# Patient Record
Sex: FEMALE | Race: WHITE | Employment: OTHER | URBAN - METROPOLITAN AREA
[De-identification: names, ages, dates, MRNs, and addresses within clinical notes are randomized per-mention and may not be internally consistent; named-entity substitution may affect disease eponyms.]

---

## 2017-04-28 PROBLEM — Z94.0 HISTORY OF KIDNEY TRANSPLANT: Chronic | Status: ACTIVE | Noted: 2017-04-28

## 2017-04-28 PROBLEM — I16.0 HYPERTENSIVE URGENCY: Status: ACTIVE | Noted: 2017-04-28

## 2017-04-28 PROBLEM — E03.9 HYPOTHYROIDISM: Chronic | Status: ACTIVE | Noted: 2017-04-28

## 2017-04-28 PROBLEM — K58.9 IBS (IRRITABLE BOWEL SYNDROME): Chronic | Status: ACTIVE | Noted: 2017-04-28

## 2017-04-28 PROBLEM — K21.9 GERD (GASTROESOPHAGEAL REFLUX DISEASE): Chronic | Status: ACTIVE | Noted: 2017-04-28

## 2017-04-28 PROBLEM — D69.6 THROMBOCYTOPENIA (HCC): Chronic | Status: ACTIVE | Noted: 2017-04-28

## 2017-04-28 PROBLEM — Z94.83 H/O PANCREAS TRANSPLANT (HCC): Chronic | Status: ACTIVE | Noted: 2017-04-28

## 2017-04-28 PROBLEM — E11.42 DIABETIC PERIPHERAL NEUROPATHY (HCC): Chronic | Status: ACTIVE | Noted: 2017-04-28

## 2017-11-24 PROBLEM — E10.10 DKA, TYPE 1, NOT AT GOAL (HCC): Status: ACTIVE | Noted: 2017-11-24

## 2018-09-28 ENCOUNTER — HOSPITAL ENCOUNTER (INPATIENT)
Age: 48
LOS: 3 days | Discharge: NURSING, REHAB OR LONG-TERM CARE | DRG: 637 | End: 2018-10-01
Attending: EMERGENCY MEDICINE | Admitting: EMERGENCY MEDICINE
Payer: MEDICARE

## 2018-09-28 DIAGNOSIS — E10.10 DIABETIC KETOACIDOSIS WITHOUT COMA ASSOCIATED WITH TYPE 1 DIABETES MELLITUS (HCC): Primary | ICD-10-CM

## 2018-09-28 DIAGNOSIS — I10 HYPERTENSION, UNSPECIFIED TYPE: ICD-10-CM

## 2018-09-28 DIAGNOSIS — E11.42 DIABETIC PERIPHERAL NEUROPATHY (HCC): Chronic | ICD-10-CM

## 2018-09-28 DIAGNOSIS — R11.0 NAUSEA: ICD-10-CM

## 2018-09-28 LAB
A/G RATIO: 1.3 (ref 1.1–2.2)
ALBUMIN SERPL-MCNC: 4.2 G/DL (ref 3.4–5)
ALP BLD-CCNC: 161 U/L (ref 40–129)
ALT SERPL-CCNC: 12 U/L (ref 10–40)
ANION GAP SERPL CALCULATED.3IONS-SCNC: 16 MMOL/L (ref 3–16)
ANION GAP SERPL CALCULATED.3IONS-SCNC: 20 MMOL/L (ref 3–16)
ANION GAP SERPL CALCULATED.3IONS-SCNC: 29 MMOL/L (ref 3–16)
ANION GAP SERPL CALCULATED.3IONS-SCNC: 34 MMOL/L (ref 3–16)
AST SERPL-CCNC: 8 U/L (ref 15–37)
BASE EXCESS VENOUS: -14.8 MMOL/L (ref -3–3)
BASOPHILS ABSOLUTE: 0.1 K/UL (ref 0–0.2)
BASOPHILS RELATIVE PERCENT: 1.1 %
BETA-HYDROXYBUTYRATE: 6.41 MMOL/L (ref 0–0.27)
BILIRUB SERPL-MCNC: 0.4 MG/DL (ref 0–1)
BUN BLDV-MCNC: 35 MG/DL (ref 7–20)
BUN BLDV-MCNC: 35 MG/DL (ref 7–20)
BUN BLDV-MCNC: 36 MG/DL (ref 7–20)
BUN BLDV-MCNC: 8 MG/DL (ref 7–20)
CALCIUM SERPL-MCNC: 8.8 MG/DL (ref 8.3–10.6)
CALCIUM SERPL-MCNC: 9 MG/DL (ref 8.3–10.6)
CALCIUM SERPL-MCNC: 9.1 MG/DL (ref 8.3–10.6)
CALCIUM SERPL-MCNC: 9.3 MG/DL (ref 8.3–10.6)
CARBOXYHEMOGLOBIN: 2.6 % (ref 0–1.5)
CHLORIDE BLD-SCNC: 80 MMOL/L (ref 99–110)
CHLORIDE BLD-SCNC: 81 MMOL/L (ref 99–110)
CHLORIDE BLD-SCNC: 86 MMOL/L (ref 99–110)
CHLORIDE BLD-SCNC: 97 MMOL/L (ref 99–110)
CO2: 11 MMOL/L (ref 21–32)
CO2: 14 MMOL/L (ref 21–32)
CO2: 20 MMOL/L (ref 21–32)
CO2: 24 MMOL/L (ref 21–32)
CREAT SERPL-MCNC: 1.6 MG/DL (ref 0.6–1.1)
CREAT SERPL-MCNC: 4.4 MG/DL (ref 0.6–1.1)
CREAT SERPL-MCNC: 4.5 MG/DL (ref 0.6–1.1)
CREAT SERPL-MCNC: 4.7 MG/DL (ref 0.6–1.1)
EOSINOPHILS ABSOLUTE: 0 K/UL (ref 0–0.6)
EOSINOPHILS RELATIVE PERCENT: 0.6 %
GFR AFRICAN AMERICAN: 12
GFR AFRICAN AMERICAN: 13
GFR AFRICAN AMERICAN: 13
GFR AFRICAN AMERICAN: 42
GFR NON-AFRICAN AMERICAN: 10
GFR NON-AFRICAN AMERICAN: 10
GFR NON-AFRICAN AMERICAN: 11
GFR NON-AFRICAN AMERICAN: 34
GLOBULIN: 3.3 G/DL
GLUCOSE BLD-MCNC: 104 MG/DL (ref 70–99)
GLUCOSE BLD-MCNC: 123 MG/DL (ref 70–99)
GLUCOSE BLD-MCNC: 129 MG/DL (ref 70–99)
GLUCOSE BLD-MCNC: 135 MG/DL (ref 70–99)
GLUCOSE BLD-MCNC: 141 MG/DL (ref 70–99)
GLUCOSE BLD-MCNC: 155 MG/DL (ref 70–99)
GLUCOSE BLD-MCNC: 174 MG/DL (ref 70–99)
GLUCOSE BLD-MCNC: 274 MG/DL (ref 70–99)
GLUCOSE BLD-MCNC: 651 MG/DL (ref 70–99)
GLUCOSE BLD-MCNC: 844 MG/DL (ref 70–99)
GLUCOSE BLD-MCNC: 928 MG/DL (ref 70–99)
GLUCOSE BLD-MCNC: >600 MG/DL (ref 70–99)
HCO3 VENOUS: 9.6 MMOL/L (ref 23–29)
HCT VFR BLD CALC: 36.1 % (ref 36–48)
HEMOGLOBIN: 11.6 G/DL (ref 12–16)
LIPASE: 295 U/L (ref 13–60)
LYMPHOCYTES ABSOLUTE: 0.9 K/UL (ref 1–5.1)
LYMPHOCYTES RELATIVE PERCENT: 13 %
MAGNESIUM: 2.1 MG/DL (ref 1.8–2.4)
MAGNESIUM: 2.2 MG/DL (ref 1.8–2.4)
MCH RBC QN AUTO: 31.3 PG (ref 26–34)
MCHC RBC AUTO-ENTMCNC: 32.2 G/DL (ref 31–36)
MCV RBC AUTO: 97.3 FL (ref 80–100)
METHEMOGLOBIN VENOUS: 0.6 %
MONOCYTES ABSOLUTE: 0.4 K/UL (ref 0–1.3)
MONOCYTES RELATIVE PERCENT: 5.2 %
NEUTROPHILS ABSOLUTE: 5.8 K/UL (ref 1.7–7.7)
NEUTROPHILS RELATIVE PERCENT: 80.1 %
O2 CONTENT, VEN: 17 VOL %
O2 SAT, VEN: 99 %
O2 THERAPY: ABNORMAL
PCO2, VEN: 20 MMHG (ref 40–50)
PDW BLD-RTO: 14.3 % (ref 12.4–15.4)
PERFORMED ON: ABNORMAL
PH VENOUS: 7.3 (ref 7.35–7.45)
PHOSPHORUS: 3.9 MG/DL (ref 2.5–4.9)
PHOSPHORUS: 4.5 MG/DL (ref 2.5–4.9)
PLATELET # BLD: 116 K/UL (ref 135–450)
PMV BLD AUTO: 9.9 FL (ref 5–10.5)
PO2, VEN: 169.7 MMHG (ref 25–40)
POTASSIUM SERPL-SCNC: 3.6 MMOL/L (ref 3.5–5.1)
POTASSIUM SERPL-SCNC: 3.9 MMOL/L (ref 3.5–5.1)
POTASSIUM SERPL-SCNC: 4.2 MMOL/L (ref 3.5–5.1)
POTASSIUM SERPL-SCNC: 4.5 MMOL/L (ref 3.5–5.1)
RBC # BLD: 3.71 M/UL (ref 4–5.2)
SODIUM BLD-SCNC: 124 MMOL/L (ref 136–145)
SODIUM BLD-SCNC: 125 MMOL/L (ref 136–145)
SODIUM BLD-SCNC: 126 MMOL/L (ref 136–145)
SODIUM BLD-SCNC: 137 MMOL/L (ref 136–145)
TCO2 CALC VENOUS: 10 MMOL/L
TOTAL PROTEIN: 7.5 G/DL (ref 6.4–8.2)
TROPONIN: 0.52 NG/ML
TROPONIN: 0.63 NG/ML
WBC # BLD: 7.3 K/UL (ref 4–11)

## 2018-09-28 PROCEDURE — 96374 THER/PROPH/DIAG INJ IV PUSH: CPT

## 2018-09-28 PROCEDURE — 99291 CRITICAL CARE FIRST HOUR: CPT | Performed by: INTERNAL MEDICINE

## 2018-09-28 PROCEDURE — 84484 ASSAY OF TROPONIN QUANT: CPT

## 2018-09-28 PROCEDURE — 82010 KETONE BODYS QUAN: CPT

## 2018-09-28 PROCEDURE — 83690 ASSAY OF LIPASE: CPT

## 2018-09-28 PROCEDURE — 85025 COMPLETE CBC W/AUTO DIFF WBC: CPT

## 2018-09-28 PROCEDURE — 2000000000 HC ICU R&B

## 2018-09-28 PROCEDURE — 2500000003 HC RX 250 WO HCPCS: Performed by: EMERGENCY MEDICINE

## 2018-09-28 PROCEDURE — 6370000000 HC RX 637 (ALT 250 FOR IP): Performed by: INTERNAL MEDICINE

## 2018-09-28 PROCEDURE — 2580000003 HC RX 258: Performed by: INTERNAL MEDICINE

## 2018-09-28 PROCEDURE — 6360000002 HC RX W HCPCS: Performed by: INTERNAL MEDICINE

## 2018-09-28 PROCEDURE — 90937 HEMODIALYSIS REPEATED EVAL: CPT

## 2018-09-28 PROCEDURE — 36415 COLL VENOUS BLD VENIPUNCTURE: CPT

## 2018-09-28 PROCEDURE — S0028 INJECTION, FAMOTIDINE, 20 MG: HCPCS | Performed by: EMERGENCY MEDICINE

## 2018-09-28 PROCEDURE — 80053 COMPREHEN METABOLIC PANEL: CPT

## 2018-09-28 PROCEDURE — 5A1D70Z PERFORMANCE OF URINARY FILTRATION, INTERMITTENT, LESS THAN 6 HOURS PER DAY: ICD-10-PCS | Performed by: INTERNAL MEDICINE

## 2018-09-28 PROCEDURE — 93010 ELECTROCARDIOGRAM REPORT: CPT | Performed by: INTERNAL MEDICINE

## 2018-09-28 PROCEDURE — 6370000000 HC RX 637 (ALT 250 FOR IP): Performed by: EMERGENCY MEDICINE

## 2018-09-28 PROCEDURE — 96375 TX/PRO/DX INJ NEW DRUG ADDON: CPT

## 2018-09-28 PROCEDURE — 96376 TX/PRO/DX INJ SAME DRUG ADON: CPT

## 2018-09-28 PROCEDURE — 6360000002 HC RX W HCPCS: Performed by: EMERGENCY MEDICINE

## 2018-09-28 PROCEDURE — 82803 BLOOD GASES ANY COMBINATION: CPT

## 2018-09-28 PROCEDURE — 84100 ASSAY OF PHOSPHORUS: CPT

## 2018-09-28 PROCEDURE — 93005 ELECTROCARDIOGRAM TRACING: CPT | Performed by: EMERGENCY MEDICINE

## 2018-09-28 PROCEDURE — 99284 EMERGENCY DEPT VISIT MOD MDM: CPT

## 2018-09-28 PROCEDURE — 83735 ASSAY OF MAGNESIUM: CPT

## 2018-09-28 RX ORDER — PANTOPRAZOLE SODIUM 40 MG/1
40 TABLET, DELAYED RELEASE ORAL
Status: DISCONTINUED | OUTPATIENT
Start: 2018-09-28 | End: 2018-10-01 | Stop reason: HOSPADM

## 2018-09-28 RX ORDER — DOXAZOSIN 2 MG/1
2 TABLET ORAL NIGHTLY
Status: DISCONTINUED | OUTPATIENT
Start: 2018-09-28 | End: 2018-09-30

## 2018-09-28 RX ORDER — HEPARIN SODIUM 5000 [USP'U]/ML
5000 INJECTION, SOLUTION INTRAVENOUS; SUBCUTANEOUS EVERY 8 HOURS SCHEDULED
Status: DISCONTINUED | OUTPATIENT
Start: 2018-09-28 | End: 2018-09-30

## 2018-09-28 RX ORDER — CALCIUM CARBONATE 200(500)MG
500 TABLET,CHEWABLE ORAL ONCE
Status: DISCONTINUED | OUTPATIENT
Start: 2018-09-28 | End: 2018-10-01 | Stop reason: HOSPADM

## 2018-09-28 RX ORDER — LOPERAMIDE HYDROCHLORIDE 2 MG/1
2 CAPSULE ORAL 4 TIMES DAILY PRN
Status: DISCONTINUED | OUTPATIENT
Start: 2018-09-28 | End: 2018-09-30 | Stop reason: SDUPTHER

## 2018-09-28 RX ORDER — DEXTROSE MONOHYDRATE 25 G/50ML
12.5 INJECTION, SOLUTION INTRAVENOUS PRN
Status: DISCONTINUED | OUTPATIENT
Start: 2018-09-28 | End: 2018-10-01 | Stop reason: HOSPADM

## 2018-09-28 RX ORDER — HYDRALAZINE HYDROCHLORIDE 20 MG/ML
20 INJECTION INTRAMUSCULAR; INTRAVENOUS EVERY 4 HOURS PRN
Status: DISCONTINUED | OUTPATIENT
Start: 2018-09-28 | End: 2018-10-01 | Stop reason: HOSPADM

## 2018-09-28 RX ORDER — NIFEDIPINE 30 MG/1
90 TABLET, FILM COATED, EXTENDED RELEASE ORAL 2 TIMES DAILY
Status: DISCONTINUED | OUTPATIENT
Start: 2018-09-28 | End: 2018-10-01 | Stop reason: HOSPADM

## 2018-09-28 RX ORDER — INSULIN GLARGINE 100 [IU]/ML
10 INJECTION, SOLUTION SUBCUTANEOUS DAILY
Status: DISCONTINUED | OUTPATIENT
Start: 2018-09-28 | End: 2018-09-29

## 2018-09-28 RX ORDER — LABETALOL HYDROCHLORIDE 5 MG/ML
5 INJECTION, SOLUTION INTRAVENOUS EVERY 4 HOURS PRN
Status: DISCONTINUED | OUTPATIENT
Start: 2018-09-28 | End: 2018-10-01 | Stop reason: HOSPADM

## 2018-09-28 RX ORDER — METOPROLOL TARTRATE 50 MG/1
100 TABLET, FILM COATED ORAL 2 TIMES DAILY
Status: DISCONTINUED | OUTPATIENT
Start: 2018-09-28 | End: 2018-10-01 | Stop reason: HOSPADM

## 2018-09-28 RX ORDER — ONDANSETRON 2 MG/ML
4 INJECTION INTRAMUSCULAR; INTRAVENOUS ONCE
Status: COMPLETED | OUTPATIENT
Start: 2018-09-28 | End: 2018-09-28

## 2018-09-28 RX ORDER — SODIUM CHLORIDE 0.9 % (FLUSH) 0.9 %
SYRINGE (ML) INJECTION
Status: COMPLETED
Start: 2018-09-28 | End: 2018-09-29

## 2018-09-28 RX ORDER — CINACALCET 30 MG/1
30 TABLET, FILM COATED ORAL DAILY
Status: DISCONTINUED | OUTPATIENT
Start: 2018-09-28 | End: 2018-10-01 | Stop reason: HOSPADM

## 2018-09-28 RX ORDER — ESOMEPRAZOLE MAGNESIUM 20 MG/1
40 FOR SUSPENSION ORAL DAILY
Status: DISCONTINUED | OUTPATIENT
Start: 2018-09-28 | End: 2018-09-28

## 2018-09-28 RX ORDER — OXYCODONE HYDROCHLORIDE 5 MG/1
5 TABLET ORAL EVERY 4 HOURS PRN
Status: DISCONTINUED | OUTPATIENT
Start: 2018-09-28 | End: 2018-10-01 | Stop reason: HOSPADM

## 2018-09-28 RX ORDER — NICOTINE POLACRILEX 4 MG
15 LOZENGE BUCCAL
Status: ACTIVE | OUTPATIENT
Start: 2018-09-28 | End: 2018-09-28

## 2018-09-28 RX ORDER — 0.9 % SODIUM CHLORIDE 0.9 %
15 INTRAVENOUS SOLUTION INTRAVENOUS ONCE
Status: DISCONTINUED | OUTPATIENT
Start: 2018-09-28 | End: 2018-09-28

## 2018-09-28 RX ORDER — NICOTINE POLACRILEX 4 MG
15 LOZENGE BUCCAL PRN
Status: DISCONTINUED | OUTPATIENT
Start: 2018-09-28 | End: 2018-10-01 | Stop reason: SDUPTHER

## 2018-09-28 RX ORDER — HYDRALAZINE HYDROCHLORIDE 20 MG/ML
5 INJECTION INTRAMUSCULAR; INTRAVENOUS EVERY 4 HOURS PRN
Status: DISCONTINUED | OUTPATIENT
Start: 2018-09-28 | End: 2018-09-28

## 2018-09-28 RX ORDER — CLONIDINE HYDROCHLORIDE 0.1 MG/1
0.2 TABLET ORAL 3 TIMES DAILY
Status: DISCONTINUED | OUTPATIENT
Start: 2018-09-28 | End: 2018-10-01 | Stop reason: HOSPADM

## 2018-09-28 RX ORDER — DEXTROSE AND SODIUM CHLORIDE 5; .45 G/100ML; G/100ML
INJECTION, SOLUTION INTRAVENOUS CONTINUOUS PRN
Status: DISCONTINUED | OUTPATIENT
Start: 2018-09-28 | End: 2018-10-01 | Stop reason: HOSPADM

## 2018-09-28 RX ORDER — SODIUM CHLORIDE 0.9 % (FLUSH) 0.9 %
SYRINGE (ML) INJECTION
Status: DISPENSED
Start: 2018-09-28 | End: 2018-09-28

## 2018-09-28 RX ORDER — OXYCODONE HYDROCHLORIDE 5 MG/1
10 TABLET ORAL EVERY 4 HOURS PRN
Status: DISCONTINUED | OUTPATIENT
Start: 2018-09-28 | End: 2018-10-01 | Stop reason: HOSPADM

## 2018-09-28 RX ORDER — DIPHENHYDRAMINE HCL 25 MG
25 TABLET ORAL NIGHTLY PRN
Status: DISCONTINUED | OUTPATIENT
Start: 2018-09-28 | End: 2018-10-01 | Stop reason: HOSPADM

## 2018-09-28 RX ORDER — RANITIDINE 300 MG/1
300 TABLET ORAL NIGHTLY
Status: ON HOLD | COMMUNITY
End: 2018-10-10 | Stop reason: HOSPADM

## 2018-09-28 RX ORDER — GABAPENTIN 300 MG/1
300 CAPSULE ORAL ONCE
Status: COMPLETED | OUTPATIENT
Start: 2018-09-28 | End: 2018-09-28

## 2018-09-28 RX ORDER — SODIUM CHLORIDE 9 MG/ML
INJECTION, SOLUTION INTRAVENOUS CONTINUOUS
Status: DISCONTINUED | OUTPATIENT
Start: 2018-09-28 | End: 2018-09-28

## 2018-09-28 RX ORDER — DEXTROSE MONOHYDRATE 50 MG/ML
100 INJECTION, SOLUTION INTRAVENOUS PRN
Status: DISCONTINUED | OUTPATIENT
Start: 2018-09-28 | End: 2018-10-01 | Stop reason: SDUPTHER

## 2018-09-28 RX ORDER — LEVOTHYROXINE SODIUM 0.03 MG/1
25 TABLET ORAL DAILY
Status: DISCONTINUED | OUTPATIENT
Start: 2018-09-28 | End: 2018-10-01 | Stop reason: HOSPADM

## 2018-09-28 RX ADMIN — METOPROLOL TARTRATE 100 MG: 50 TABLET ORAL at 21:33

## 2018-09-28 RX ADMIN — HYDRALAZINE HYDROCHLORIDE 20 MG: 20 INJECTION INTRAMUSCULAR; INTRAVENOUS at 18:18

## 2018-09-28 RX ADMIN — GABAPENTIN 300 MG: 300 CAPSULE ORAL at 21:36

## 2018-09-28 RX ADMIN — PROCHLORPERAZINE EDISYLATE 10 MG: 5 INJECTION INTRAMUSCULAR; INTRAVENOUS at 20:49

## 2018-09-28 RX ADMIN — CLONIDINE HYDROCHLORIDE 0.2 MG: 0.1 TABLET ORAL at 21:33

## 2018-09-28 RX ADMIN — SODIUM CHLORIDE 5.81 UNITS/HR: 9 INJECTION, SOLUTION INTRAVENOUS at 12:07

## 2018-09-28 RX ADMIN — HYDRALAZINE HYDROCHLORIDE 5 MG: 20 INJECTION INTRAMUSCULAR; INTRAVENOUS at 17:26

## 2018-09-28 RX ADMIN — PROCHLORPERAZINE EDISYLATE 10 MG: 5 INJECTION INTRAMUSCULAR; INTRAVENOUS at 11:14

## 2018-09-28 RX ADMIN — OXYCODONE HYDROCHLORIDE 10 MG: 5 TABLET ORAL at 21:34

## 2018-09-28 RX ADMIN — FAMOTIDINE 20 MG: 10 INJECTION, SOLUTION INTRAVENOUS at 08:20

## 2018-09-28 RX ADMIN — ONDANSETRON 4 MG: 2 INJECTION INTRAMUSCULAR; INTRAVENOUS at 07:02

## 2018-09-28 RX ADMIN — NIFEDIPINE 90 MG: 30 TABLET, FILM COATED, EXTENDED RELEASE ORAL at 21:33

## 2018-09-28 RX ADMIN — DOXERCALCIFEROL 2 MCG: 2 INJECTION, SOLUTION INTRAVENOUS at 17:29

## 2018-09-28 RX ADMIN — HEPARIN SODIUM 5000 UNITS: 5000 INJECTION INTRAVENOUS; SUBCUTANEOUS at 22:10

## 2018-09-28 RX ADMIN — HEPARIN SODIUM 5000 UNITS: 5000 INJECTION INTRAVENOUS; SUBCUTANEOUS at 15:07

## 2018-09-28 RX ADMIN — ONDANSETRON 4 MG: 2 INJECTION INTRAMUSCULAR; INTRAVENOUS at 08:20

## 2018-09-28 RX ADMIN — MUPIROCIN: 20 OINTMENT TOPICAL at 21:35

## 2018-09-28 RX ADMIN — LOPERAMIDE HYDROCHLORIDE 2 MG: 2 CAPSULE ORAL at 22:07

## 2018-09-28 RX ADMIN — INSULIN HUMAN 15 UNITS: 100 INJECTION, SOLUTION PARENTERAL at 08:09

## 2018-09-28 RX ADMIN — DOXAZOSIN 2 MG: 2 TABLET ORAL at 21:34

## 2018-09-28 ASSESSMENT — ENCOUNTER SYMPTOMS
VOMITING: 1
SHORTNESS OF BREATH: 0
HEARTBURN: 1
BLOOD IN STOOL: 0
PHOTOPHOBIA: 0
EYES NEGATIVE: 1
DOUBLE VISION: 0
DIARRHEA: 0
RESPIRATORY NEGATIVE: 1
ABDOMINAL PAIN: 0
BACK PAIN: 0
COUGH: 0
NAUSEA: 1
BLURRED VISION: 0
HEARTBURN: 0
SPUTUM PRODUCTION: 0
ORTHOPNEA: 0

## 2018-09-28 ASSESSMENT — PAIN DESCRIPTION - LOCATION: LOCATION: FOOT

## 2018-09-28 ASSESSMENT — PAIN SCALES - GENERAL
PAINLEVEL_OUTOF10: 8
PAINLEVEL_OUTOF10: 0
PAINLEVEL_OUTOF10: 8
PAINLEVEL_OUTOF10: 0
PAINLEVEL_OUTOF10: 0

## 2018-09-28 ASSESSMENT — PAIN DESCRIPTION - ORIENTATION: ORIENTATION: RIGHT;LEFT

## 2018-09-28 NOTE — PROGRESS NOTES
Perfect serve:     Pt here for DKA. BS is now 129. Gap 16. Patient is dialysis patient & just finished HD. No fluids running. Insulin gtt running at 1/hr continuously. Patient able to eat. Dr Gail Nathan did not put orders when to stop gtt. Patient will be receiving lantus tonight. Do you want to stop gtt now or wait till gap is 12? Also would like her gabapentin ordered. Takes 300mg after HD. Spoke to Dr Joe So. Wants to keep her on the gtt until her gap closes. Once gap is 12, we can turn off gtt. Ordered oxy and gapapentin as well.

## 2018-09-28 NOTE — CONSULTS
Pulmonary & Critical Care Consultation Note   Marlo Reyes MD    REASON FOR CONSULTATION/CC: acute DKA    Consult at request of  Laverne Peguero MD  PCP: Yves Basurto MD    HISTORY OF PRESENT ILLNESS:   50y.o. year old female with ESRD and DM, prior history of kidney transplant. She presented with 2 day history of worsening nausea, weakness, and fatigue. Workup in the ED was consistent with acute DKA, she was placed on DKA protocol insulin gtt and admitted to the ICU. She was evaluated by nephrology who recommended HD today she has been receiving. When seen she remained with significant nausea.        Past Medical History:   Diagnosis Date    (QFT) QuantiFERON-TB test reaction without active tuberculosis     Arthritis     lower back    Asthma     allergy induced    Blood transfusion     Coma ([facility] [address]     INDUCED FOR HYPERTENSION X1, ANOTHER EPISODE    Diabetes mellitus ([facility] [address]     type 1, dx at 23    DKA (diabetic ketoacidoses) (HCC)     End-stage renal disease (ESRD) ([facility] [address]     ESRD on dialysis ([facility] [address]     Mon-Wed-Fri Ankush     GERD (gastroesophageal reflux disease)     Heart murmur     Hemodialysis patient ([facility] [address]     M-W-F    History of blood transfusion     Hypercalcemia     Hypertension     IBS (irritable bowel syndrome)     MRSA (methicillin resistant staph aureus) culture positive 16    nares    MRSA (methicillin resistant staph aureus) culture positive 16    nares NEGATIVE    PONV (postoperative nausea and vomiting)     Stress fracture of ankle     left    Thyroid disease     hypothyroidism    UGI bleed        Past Surgical History:   Procedure Laterality Date    ABDOMEN SURGERY      peritoneal cath    BRAIN BIOPSY      encephaolpathy due to immunosuppresive meds    CATARACT REMOVAL Left      SECTION      COLONOSCOPY      COLONOSCOPY  2016    no specimen    DIALYSIS FISTULA CREATION Left     upper arm    ENDOSCOPY, COLON, DIAGNOSTIC      EYE SURGERY Bilateral     [facility], Dr Neelam Pham, retinopathy    FRACTURE SURGERY Left 1/14/2016    IM nailing left tibial fibula fracture    KIDNEY TRANSPLANT  2002    at time of pancreas tx    KIDNEY TRANSPLANT Left 2002    PANCREAS SURGERY  2002    TRANSPLANT    UPPER GASTROINTESTINAL ENDOSCOPY  12/2016    biopsy-esophagitis       family history includes Cancer in her mother; Heart Disease in her father. She was adopted. Social History   Substance Use Topics    Smoking status: Never Smoker    Smokeless tobacco: Never Used    Alcohol use Yes      Comment: 2 X per year        Scheduled Meds:   calcium acetate  2,001 mg Oral TID WC    cloNIDine  0.2 mg Oral TID    doxazosin  2 mg Oral Nightly    levothyroxine  25 mcg Oral Daily    metoprolol  100 mg Oral BID    NIFEdipine  90 mg Oral BID    pregabalin  100 mg Oral BID    cinacalcet  30 mg Oral Daily    heparin (porcine)  5,000 Units Subcutaneous 3 times per day    calcium carbonate  500 mg Oral Once    pantoprazole  40 mg Oral QAM AC    sodium chloride flush        mupirocin   Nasal BID    doxercalciferol  2 mcg Intravenous Q MWF    darbepoetin fredy-polysorbate  25 mcg Subcutaneous Weekly    insulin glargine  10 Units Subcutaneous Daily    insulin lispro  0-12 Units Subcutaneous TID        Continuous Infusions:   dextrose 5 % and 0.45 % NaCl      insulin (HUMAN R) non-weight based infusion 1 Units/hr (09/28/18 1807)    dextrose         PRN Meds:  diphenhydrAMINE, labetalol, prochlorperazine, dextrose, dextrose 5 % and 0.45 % NaCl, glucose, dextrose, glucagon (rDNA), dextrose, glucose, hydrALAZINE  Inpatient consult to Critical Care  Consult performed by: Kulwinder Flowers ordered by: Yaneth Li:  Patient is allergic to lisinopril; adhesive tape; metoclopramide; tramadol; codeine; and reglan [metoclopramide hcl]. REVIEW OF SYSTEMS:  Review of Systems   Constitutional: Negative for chills, fever and weight loss. HENT: Negative for hearing loss and nosebleeds. Eyes: Negative for blurred vision, double vision and photophobia. Respiratory: Negative for cough, sputum production and shortness of breath. Cardiovascular: Negative for chest pain, palpitations and orthopnea. Gastrointestinal: Positive for nausea and vomiting. Negative for abdominal pain and heartburn. Genitourinary: Negative for dysuria, frequency and urgency. Musculoskeletal: Negative for back pain, myalgias and neck pain. Neurological: Negative for dizziness, focal weakness, loss of consciousness and headaches. Endo/Heme/Allergies: Negative for environmental allergies and polydipsia. Does not bruise/bleed easily. Objective:   PHYSICAL EXAM:  BP (!) 198/86   Pulse 64   Temp 98 °F (36.7 °C) (Oral)   Resp 12   Ht 5' 4\" (1.626 m)   Wt 131 lb 13.4 oz (59.8 kg)   LMP 03/12/2008   SpO2 96%   BMI 22.63 kg/m²    Physical Exam   Constitutional: She appears well-developed and well-nourished. No distress. HENT:   Head: Normocephalic and atraumatic. Mouth/Throat: Oropharynx is clear and moist. No oropharyngeal exudate. Eyes: Pupils are equal, round, and reactive to light. EOM are normal.   Neck: Neck supple. No JVD present. Cardiovascular: Normal heart sounds. Exam reveals no gallop and no friction rub. No murmur heard. Pulmonary/Chest: Effort normal. She has no wheezes. She has no rales. Equal chest rise and expansion bilaterally   Abdominal: Soft. Bowel sounds are normal. She exhibits no distension. There is no tenderness. Musculoskeletal: Normal range of motion. She exhibits no edema. Lymphadenopathy:     She has no cervical adenopathy. Neurological: She is alert. No cranial nerve deficit. CN 2-12 grossly intact   Skin: Skin is warm and dry. No rash noted. She is not diaphoretic.           Data Reviewed:   LABS:  CBC:   Recent Labs      09/28/18   0630   WBC  7.3   HGB  11.6*   HCT  36.1   MCV  97.3   PLT  116*

## 2018-09-28 NOTE — PROGRESS NOTES
PS hosp @ [id number]  RE: DKA, HTN per Dr. Stacy Crabtree. Dr. Virgilio Quiroz returned call @ [phone], spoke to Dr. Stacy Crabtree. Pt discharged home with parent/guardian. Pt acting age appropriately, respirations regular and unlabored, cap refill less than two seconds. Skin pink, dry and warm. Lungs clear bilaterally. No further complaints at this time. Parent/guardian verbalized understanding of discharge paperwork and has no further questions at this time. Education provided about continuation of care, follow up care and medication administration. Parent/guardian able to provided teach back about discharge instructions.

## 2018-09-28 NOTE — H&P
denied abdominal pain, however. - her children have been sick, clinically a GI virus seems less likely however, supportive care. - Had esophagitis in 2016 on EGD biopsy, also h/o PUD and GERD. Continue PPI. HTN  - nifedipine, metoprolol, doxazosin, clonidine    ESRD on HD MWF  - nephrology consulted     Chronic troponin elevation  - likely related to HTN and ESRD. No chest pain, EKG not suggestive of ischemia. Hypothyroidism  - Clinically euthyroid. Continue home dose of levothyroxine. DVT Prophylaxis: heparin SC  Diet: Diet NPO Effective Now  Code Status: Full Code    PT/OT Eval Status: not indicated    Dispo - perhaps 9/30, pending toleration of PO. She lives at home. Michelle Tatum MD    Thank you Gertrude Bourgeois MD for the opportunity to be involved in this patient's care. If you have any questions or concerns please feel free to contact me at [phone].

## 2018-09-28 NOTE — CONSULTS
Nephrology Consult Note  [email or web address]        Reason for Consult:  ESRD    HISTORY OF PRESENT ILLNESS:      The patient is a 50 y.o. female with significant past medical history of DM type 1, HTN, GERD and hypothyroidism who presents with persistent nausea. She has ESRD likely secondary to DM nephropathy. She goes to [facility] unit every MWF, 3H 45mins, for her treatments. She has a LUE AVG as her vascular access. Her TW 57.5kg. Her last full HD was on 09/24/18. She missed her HD last Wednesday because she was feeling sick. She was complaining of persistent nausea with some vomiting. Denies any fever, chills, abdominal pain or diarrhea. She went yesterday to the outpatient dialysis unit but only received about an hour treatment. With the persistence of symptoms she was then brought in to the ED for further evaluation and management.         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 tuberculosis     Arthritis     lower back    Asthma     allergy induced    Blood transfusion     Coma ([facility] [address]     INDUCED FOR HYPERTENSION X1, ANOTHER EPISODE    Diabetes mellitus ([address]     type 1, dx at 23    DKA (diabetic ketoacidoses) (HCC)     End-stage renal disease (ESRD) ([facility] [address]     ESRD on dialysis ([facility] [address]     Mon-Wed-Fri [employer]    GERD (gastroesophageal reflux disease)     Heart murmur     Hemodialysis patient ([facility] [address]     M-W-F    History of blood transfusion     Hypercalcemia     Hypertension     IBS (irritable bowel syndrome)     MRSA (methicillin resistant staph aureus) culture positive 4/11/16    nares    MRSA (methicillin resistant staph aureus) culture positive 6/7/16    nares NEGATIVE    PONV (postoperative nausea and vomiting)     Stress fracture of ankle     left    Thyroid disease     hypothyroidism    UGI bleed        Past Surgical History:        Procedure Laterality Date    ABDOMEN SURGERY      peritoneal cath   Christian Geiger BRAIN BIOPSY Relation Age of Onset    Adopted: Yes    Cancer Mother         breast    Heart Disease Father      REVIEW OF SYSTEMS:    Review of Systems   Constitutional: Positive for malaise/fatigue. Negative for chills and fever. HENT: Negative. Eyes: Negative. Respiratory: Negative. Cardiovascular: Negative. Gastrointestinal: Positive for heartburn, nausea and vomiting. Negative for abdominal pain, blood in stool and diarrhea. Musculoskeletal: Negative. Skin: Negative. Neurological: Negative. Psychiatric/Behavioral: Negative. PHYSICAL EXAM:    Vitals:    BP (!) 183/66   Pulse 70   Temp 97.7 °F (36.5 °C) (Oral)   Resp 18   Ht 5' 4\" (1.626 m)   Wt 128 lb (58.1 kg)   LMP 03/12/2008   SpO2 97%   BMI 21.97 kg/m²   No intake/output data recorded. No intake/output data recorded. Physical Exam:  Physical Exam   Constitutional: She is oriented to person, place, and time. She has a sickly appearance. HENT:   Head: Normocephalic and atraumatic. Nose: Nose normal.   Dry oral mucosa   Eyes: Conjunctivae are normal. No scleral icterus. Neck: Neck supple. No JVD present. No tracheal deviation present. No thyromegaly present. Cardiovascular: Normal rate and regular rhythm. Exam reveals no gallop and no friction rub. Pulmonary/Chest: Effort normal. No respiratory distress. She has no wheezes. She has no rales. Abdominal: Soft. Bowel sounds are normal. She exhibits no distension. There is tenderness. Musculoskeletal: She exhibits no edema. Neurological: She is alert and oriented to person, place, and time. Skin: Skin is warm. Psychiatric: She has a normal mood and affect. Vitals reviewed.       DATA:    Recent Labs      09/28/18   0630   WBC  7.3   HGB  11.6*   HCT  36.1   MCV  97.3   PLT  116*     Recent Labs      09/28/18   0630   NA  125*   K  4.5   CL  80*   CO2  11*   GLUCOSE  928*   BUN  35*   CREATININE  4.4*   LABGLOM  11*   GFRAA  13*

## 2018-09-28 NOTE — ED PROVIDER NOTES
Emergency Department Provider Note  Location: [address] [place] [address]  ED  9/28/2018     Patient Identification  Madhu Trammell is a 50 y.o. female    Chief Complaint  Nausea (Dialysis patient, feeling nauseated for a couple of days. NO emesis. Went to Dialysis yesterday. Was suppose to go this am, but to nauseated. No chest pain, no diarrhea, no SOB, no abdominal pain)      Mode of Arrival  EMS    HPI  (History provided by patient)  This is a 50 y.o. female with a PMH significant for type 1 DM, ESRD on dialysis MWF presented today for nausea. Patient states she has nausea for 2 days. No vomiting. She missed her dialysis on Wednesday because \"I did not feel good. \"  She did go yesterday instead and got \"half a session\" dialysis. She was suppose to go back to dialysis this morning but she did not feel good due to nausea. She denies abdominal pain. No diarrhea. Denies chest pain or shortness of breath. Patient states her daughter was first sick with a GI bug followed by URI since the beginning of school year. Her son now also has URI symptoms. She herself denies congestion, cough, rhinorrhea. Her only symptoms is nausea. ROS  10 systems reviewed, pertinent positives per HPI otherwise noted to be negative     I have reviewed the following nursing documentation:  Allergies: Allergies   Allergen Reactions    Lisinopril Shortness Of Breath     States mouth was swelling  \"almost stop breathing\"    Adhesive Tape      phenanthrene    Metoclopramide     Tramadol      unknown    Codeine Nausea And Vomiting    Reglan [Metoclopramide Hcl] Anxiety     JITTERY       Past medical history:  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; Arthritis; Asthma; Blood transfusion; Coma ([facility] [address] Diabetes mellitus ([facility] [address] DKA (diabetic ketoacidoses) ([facility] [address] End-stage renal disease (ESRD) ([facility] [address] ESRD on dialysis ([facility] [address] GERD (gastroesophageal reflux disease);  Heart murmur;

## 2018-09-29 LAB
ALBUMIN SERPL-MCNC: 3.5 G/DL (ref 3.4–5)
ALP BLD-CCNC: 119 U/L (ref 40–129)
ALT SERPL-CCNC: 8 U/L (ref 10–40)
ANION GAP SERPL CALCULATED.3IONS-SCNC: 11 MMOL/L (ref 3–16)
ANION GAP SERPL CALCULATED.3IONS-SCNC: 12 MMOL/L (ref 3–16)
ANION GAP SERPL CALCULATED.3IONS-SCNC: 13 MMOL/L (ref 3–16)
AST SERPL-CCNC: 8 U/L (ref 15–37)
BILIRUB SERPL-MCNC: 0.4 MG/DL (ref 0–1)
BILIRUBIN DIRECT: <0.2 MG/DL (ref 0–0.3)
BILIRUBIN, INDIRECT: ABNORMAL MG/DL (ref 0–1)
BUN BLDV-MCNC: 10 MG/DL (ref 7–20)
BUN BLDV-MCNC: 11 MG/DL (ref 7–20)
BUN BLDV-MCNC: 14 MG/DL (ref 7–20)
CALCIUM SERPL-MCNC: 9 MG/DL (ref 8.3–10.6)
CALCIUM SERPL-MCNC: 9.3 MG/DL (ref 8.3–10.6)
CALCIUM SERPL-MCNC: 9.8 MG/DL (ref 8.3–10.6)
CHLORIDE BLD-SCNC: 100 MMOL/L (ref 99–110)
CHLORIDE BLD-SCNC: 98 MMOL/L (ref 99–110)
CHLORIDE BLD-SCNC: 99 MMOL/L (ref 99–110)
CO2: 27 MMOL/L (ref 21–32)
CO2: 28 MMOL/L (ref 21–32)
CO2: 28 MMOL/L (ref 21–32)
CREAT SERPL-MCNC: 2.1 MG/DL (ref 0.6–1.1)
CREAT SERPL-MCNC: 2.3 MG/DL (ref 0.6–1.1)
CREAT SERPL-MCNC: 2.8 MG/DL (ref 0.6–1.1)
GFR AFRICAN AMERICAN: 22
GFR AFRICAN AMERICAN: 27
GFR AFRICAN AMERICAN: 30
GFR NON-AFRICAN AMERICAN: 18
GFR NON-AFRICAN AMERICAN: 23
GFR NON-AFRICAN AMERICAN: 25
GLUCOSE BLD-MCNC: 106 MG/DL (ref 70–99)
GLUCOSE BLD-MCNC: 140 MG/DL (ref 70–99)
GLUCOSE BLD-MCNC: 142 MG/DL (ref 70–99)
GLUCOSE BLD-MCNC: 161 MG/DL (ref 70–99)
GLUCOSE BLD-MCNC: 255 MG/DL (ref 70–99)
GLUCOSE BLD-MCNC: 267 MG/DL (ref 70–99)
GLUCOSE BLD-MCNC: 70 MG/DL (ref 70–99)
GLUCOSE BLD-MCNC: 73 MG/DL (ref 70–99)
GLUCOSE BLD-MCNC: 74 MG/DL (ref 70–99)
GLUCOSE BLD-MCNC: 78 MG/DL (ref 70–99)
GLUCOSE BLD-MCNC: 81 MG/DL (ref 70–99)
GLUCOSE BLD-MCNC: 87 MG/DL (ref 70–99)
GLUCOSE BLD-MCNC: 92 MG/DL (ref 70–99)
GLUCOSE BLD-MCNC: 97 MG/DL (ref 70–99)
HBV SURFACE AB TITR SER: 28.77 MIU/ML
HCT VFR BLD CALC: 33.9 % (ref 36–48)
HEMOGLOBIN: 11.6 G/DL (ref 12–16)
HEPATITIS B SURFACE ANTIGEN INTERPRETATION: NORMAL
LACTIC ACID: 0.8 MMOL/L (ref 0.4–2)
LIPASE: 118 U/L (ref 13–60)
MAGNESIUM: 2 MG/DL (ref 1.8–2.4)
MCH RBC QN AUTO: 31.3 PG (ref 26–34)
MCHC RBC AUTO-ENTMCNC: 34.1 G/DL (ref 31–36)
MCV RBC AUTO: 91.9 FL (ref 80–100)
PDW BLD-RTO: 14.3 % (ref 12.4–15.4)
PERFORMED ON: ABNORMAL
PERFORMED ON: NORMAL
PHOSPHORUS: 2.3 MG/DL (ref 2.5–4.9)
PHOSPHORUS: 3 MG/DL (ref 2.5–4.9)
PHOSPHORUS: 3.5 MG/DL (ref 2.5–4.9)
PLATELET # BLD: 102 K/UL (ref 135–450)
PMV BLD AUTO: 8.6 FL (ref 5–10.5)
POTASSIUM SERPL-SCNC: 3.5 MMOL/L (ref 3.5–5.1)
POTASSIUM SERPL-SCNC: 3.7 MMOL/L (ref 3.5–5.1)
POTASSIUM SERPL-SCNC: 3.8 MMOL/L (ref 3.5–5.1)
RBC # BLD: 3.69 M/UL (ref 4–5.2)
SODIUM BLD-SCNC: 138 MMOL/L (ref 136–145)
SODIUM BLD-SCNC: 138 MMOL/L (ref 136–145)
SODIUM BLD-SCNC: 140 MMOL/L (ref 136–145)
TOTAL PROTEIN: 6 G/DL (ref 6.4–8.2)
TROPONIN: 0.53 NG/ML
WBC # BLD: 6.5 K/UL (ref 4–11)

## 2018-09-29 PROCEDURE — 86704 HEP B CORE ANTIBODY TOTAL: CPT

## 2018-09-29 PROCEDURE — 2580000003 HC RX 258: Performed by: INTERNAL MEDICINE

## 2018-09-29 PROCEDURE — 97162 PT EVAL MOD COMPLEX 30 MIN: CPT

## 2018-09-29 PROCEDURE — 97116 GAIT TRAINING THERAPY: CPT

## 2018-09-29 PROCEDURE — 6370000000 HC RX 637 (ALT 250 FOR IP): Performed by: INTERNAL MEDICINE

## 2018-09-29 PROCEDURE — 6360000002 HC RX W HCPCS: Performed by: INTERNAL MEDICINE

## 2018-09-29 PROCEDURE — 85027 COMPLETE CBC AUTOMATED: CPT

## 2018-09-29 PROCEDURE — 6370000000 HC RX 637 (ALT 250 FOR IP): Performed by: NURSE PRACTITIONER

## 2018-09-29 PROCEDURE — 84100 ASSAY OF PHOSPHORUS: CPT

## 2018-09-29 PROCEDURE — 86706 HEP B SURFACE ANTIBODY: CPT

## 2018-09-29 PROCEDURE — 83605 ASSAY OF LACTIC ACID: CPT

## 2018-09-29 PROCEDURE — 90937 HEMODIALYSIS REPEATED EVAL: CPT

## 2018-09-29 PROCEDURE — G8978 MOBILITY CURRENT STATUS: HCPCS

## 2018-09-29 PROCEDURE — 1200000000 HC SEMI PRIVATE

## 2018-09-29 PROCEDURE — 87340 HEPATITIS B SURFACE AG IA: CPT

## 2018-09-29 PROCEDURE — G8979 MOBILITY GOAL STATUS: HCPCS

## 2018-09-29 PROCEDURE — 80048 BASIC METABOLIC PNL TOTAL CA: CPT

## 2018-09-29 PROCEDURE — 2580000003 HC RX 258

## 2018-09-29 PROCEDURE — 84484 ASSAY OF TROPONIN QUANT: CPT

## 2018-09-29 PROCEDURE — 80076 HEPATIC FUNCTION PANEL: CPT

## 2018-09-29 PROCEDURE — 83735 ASSAY OF MAGNESIUM: CPT

## 2018-09-29 PROCEDURE — 36415 COLL VENOUS BLD VENIPUNCTURE: CPT

## 2018-09-29 PROCEDURE — 83690 ASSAY OF LIPASE: CPT

## 2018-09-29 RX ORDER — INSULIN GLARGINE 100 [IU]/ML
10 INJECTION, SOLUTION SUBCUTANEOUS NIGHTLY
Status: DISCONTINUED | OUTPATIENT
Start: 2018-09-29 | End: 2018-09-30

## 2018-09-29 RX ORDER — GABAPENTIN 100 MG/1
200 CAPSULE ORAL DAILY
Status: DISCONTINUED | OUTPATIENT
Start: 2018-09-30 | End: 2018-09-29

## 2018-09-29 RX ADMIN — CALCIUM ACETATE 2001 MG: 667 CAPSULE ORAL at 16:32

## 2018-09-29 RX ADMIN — PREGABALIN 100 MG: 75 CAPSULE ORAL at 09:19

## 2018-09-29 RX ADMIN — CINACALCET HYDROCHLORIDE 30 MG: 30 TABLET, COATED ORAL at 11:42

## 2018-09-29 RX ADMIN — CLONIDINE HYDROCHLORIDE 0.2 MG: 0.1 TABLET ORAL at 09:19

## 2018-09-29 RX ADMIN — DOXAZOSIN 2 MG: 2 TABLET ORAL at 21:01

## 2018-09-29 RX ADMIN — METOPROLOL TARTRATE 100 MG: 50 TABLET ORAL at 21:01

## 2018-09-29 RX ADMIN — CLONIDINE HYDROCHLORIDE 0.2 MG: 0.1 TABLET ORAL at 14:03

## 2018-09-29 RX ADMIN — LOPERAMIDE HYDROCHLORIDE 2 MG: 2 CAPSULE ORAL at 13:16

## 2018-09-29 RX ADMIN — INSULIN LISPRO 3 UNITS: 100 INJECTION, SOLUTION INTRAVENOUS; SUBCUTANEOUS at 21:23

## 2018-09-29 RX ADMIN — DEXTROSE MONOHYDRATE 12.5 G: 25 INJECTION, SOLUTION INTRAVENOUS at 04:33

## 2018-09-29 RX ADMIN — PANTOPRAZOLE SODIUM 40 MG: 40 TABLET, DELAYED RELEASE ORAL at 09:19

## 2018-09-29 RX ADMIN — HEPARIN SODIUM 5000 UNITS: 5000 INJECTION INTRAVENOUS; SUBCUTANEOUS at 14:04

## 2018-09-29 RX ADMIN — INSULIN LISPRO 6 UNITS: 100 INJECTION, SOLUTION INTRAVENOUS; SUBCUTANEOUS at 16:37

## 2018-09-29 RX ADMIN — LOPERAMIDE HYDROCHLORIDE 2 MG: 2 CAPSULE ORAL at 21:01

## 2018-09-29 RX ADMIN — MUPIROCIN: 20 OINTMENT TOPICAL at 09:20

## 2018-09-29 RX ADMIN — INSULIN GLARGINE 10 UNITS: 100 INJECTION, SOLUTION SUBCUTANEOUS at 01:32

## 2018-09-29 RX ADMIN — HYDRALAZINE HYDROCHLORIDE 20 MG: 20 INJECTION INTRAMUSCULAR; INTRAVENOUS at 17:32

## 2018-09-29 RX ADMIN — OXYCODONE HYDROCHLORIDE 10 MG: 5 TABLET ORAL at 18:53

## 2018-09-29 RX ADMIN — SODIUM CHLORIDE, PRESERVATIVE FREE 10 ML: 5 INJECTION INTRAVENOUS at 08:03

## 2018-09-29 RX ADMIN — INSULIN LISPRO 1 UNITS: 100 INJECTION, SOLUTION INTRAVENOUS; SUBCUTANEOUS at 12:10

## 2018-09-29 RX ADMIN — NIFEDIPINE 90 MG: 30 TABLET, FILM COATED, EXTENDED RELEASE ORAL at 09:18

## 2018-09-29 RX ADMIN — METOPROLOL TARTRATE 100 MG: 50 TABLET ORAL at 09:19

## 2018-09-29 RX ADMIN — CLONIDINE HYDROCHLORIDE 0.2 MG: 0.1 TABLET ORAL at 21:01

## 2018-09-29 RX ADMIN — HEPARIN SODIUM 5000 UNITS: 5000 INJECTION INTRAVENOUS; SUBCUTANEOUS at 21:24

## 2018-09-29 RX ADMIN — LEVOTHYROXINE SODIUM 25 MCG: 25 TABLET ORAL at 08:03

## 2018-09-29 RX ADMIN — NIFEDIPINE 90 MG: 30 TABLET, FILM COATED, EXTENDED RELEASE ORAL at 21:01

## 2018-09-29 RX ADMIN — CALCIUM ACETATE 2001 MG: 667 CAPSULE ORAL at 12:02

## 2018-09-29 RX ADMIN — LOPERAMIDE HYDROCHLORIDE 2 MG: 2 CAPSULE ORAL at 16:33

## 2018-09-29 RX ADMIN — OXYCODONE HYDROCHLORIDE 10 MG: 5 TABLET ORAL at 23:06

## 2018-09-29 RX ADMIN — LOPERAMIDE HYDROCHLORIDE 2 MG: 2 CAPSULE ORAL at 09:18

## 2018-09-29 RX ADMIN — HEPARIN SODIUM 5000 UNITS: 5000 INJECTION INTRAVENOUS; SUBCUTANEOUS at 06:54

## 2018-09-29 RX ADMIN — HYDRALAZINE HYDROCHLORIDE 20 MG: 20 INJECTION INTRAMUSCULAR; INTRAVENOUS at 11:56

## 2018-09-29 RX ADMIN — CALCIUM ACETATE 2001 MG: 667 CAPSULE ORAL at 08:03

## 2018-09-29 RX ADMIN — INSULIN GLARGINE 10 UNITS: 100 INJECTION, SOLUTION SUBCUTANEOUS at 23:48

## 2018-09-29 ASSESSMENT — PAIN DESCRIPTION - DESCRIPTORS: DESCRIPTORS: ACHING

## 2018-09-29 ASSESSMENT — PAIN SCALES - GENERAL
PAINLEVEL_OUTOF10: 7
PAINLEVEL_OUTOF10: 5
PAINLEVEL_OUTOF10: 4
PAINLEVEL_OUTOF10: 7
PAINLEVEL_OUTOF10: 0

## 2018-09-29 ASSESSMENT — PAIN DESCRIPTION - ORIENTATION: ORIENTATION: RIGHT;LEFT

## 2018-09-29 ASSESSMENT — PAIN DESCRIPTION - FREQUENCY: FREQUENCY: CONTINUOUS

## 2018-09-29 ASSESSMENT — PAIN DESCRIPTION - LOCATION: LOCATION: LEG;FOOT

## 2018-09-29 ASSESSMENT — PAIN DESCRIPTION - ONSET: ONSET: ON-GOING

## 2018-09-29 ASSESSMENT — PAIN DESCRIPTION - PROGRESSION
CLINICAL_PROGRESSION: NOT CHANGED

## 2018-09-29 ASSESSMENT — PAIN DESCRIPTION - PAIN TYPE
TYPE: OTHER (COMMENT)
TYPE: ACUTE PAIN
TYPE: CHRONIC PAIN

## 2018-09-29 NOTE — PROGRESS NOTES
While getting Pt on scale Pt's legs weakened and buckled. One RN on each side to catch Pt under her arms. Pt was safely moved back into bed. No harm to Pt indicated. Pt denies having any pain. Pt states she has had intermittent weakness in her legs and believes it is due to her starting Gabapentin/Neurontin. Mentioned the incident during report and Pt is going to be started back on Lyrica and discontinuing her Neurontin. Will continue to monitor.

## 2018-09-29 NOTE — PROGRESS NOTES
Hospitalist Progress Note      PCP: Gertrude Bourgeois MD    Date of Admission: 9/28/2018    Chief Complaint: nausea    Hospital Course: 50 Y F with a h/o HTN and DM1 diagnosed at age 23 and complicated by many admissions for DKA, with previous kidney and pancreas transplant in 2002 but long since has been back on HD presents to the ED with a couple days of worsening nausea, lethargy, and malaise, to the point that she felt too ill to go to her HD session today. In the ED her glucose was 928 and she had an anion gap including a bicarb of 11. She has a h/o poor compliance and upon admission she could not tell me the doses of her insulins. Subjective:  Feels much better today. Essentially back to normal. Ordered lunch, back on SC insulin.          Medications:  Reviewed    Infusion Medications    dextrose 5 % and 0.45 % NaCl      dextrose       Scheduled Medications    [START ON 9/30/2018] gabapentin  200 mg Oral Daily    calcium acetate  2,001 mg Oral TID WC    cloNIDine  0.2 mg Oral TID    doxazosin  2 mg Oral Nightly    levothyroxine  25 mcg Oral Daily    metoprolol  100 mg Oral BID    NIFEdipine  90 mg Oral BID    cinacalcet  30 mg Oral Daily    heparin (porcine)  5,000 Units Subcutaneous 3 times per day    calcium carbonate  500 mg Oral Once    pantoprazole  40 mg Oral QAM AC    mupirocin   Nasal BID    doxercalciferol  2 mcg Intravenous Q MWF    darbepoetin fredy-polysorbate  25 mcg Subcutaneous Weekly    insulin glargine  10 Units Subcutaneous Daily    insulin lispro  0-12 Units Subcutaneous TID WC     PRN Meds: diphenhydrAMINE, labetalol, prochlorperazine, dextrose, dextrose 5 % and 0.45 % NaCl, glucose, dextrose, glucagon (rDNA), dextrose, hydrALAZINE, oxyCODONE **OR** oxyCODONE, loperamide      Intake/Output Summary (Last 24 hours) at 09/29/18 1152  Last data filed at 09/29/18 0631   Gross per 24 hour   Intake              695 ml   Output             2800 ml   Net Urinalysis:    No results found for: [address] [facility], [address], [address]    Radiology:  No orders to display           Assessment/Plan:    Active Hospital Problems    Diagnosis Date Noted    DKA, type 1 ([facility] [address] [E10.10] 10/21/2015     Priority: High    ESRD (end stage renal disease) on dialysis ([facility] [address] [N18.6, Z99.2] 07/03/2011     Priority: High    DKA, type 1, not at goal [facility]) [E10.10] 11/24/2017    DM1/IDDM [E11.9, Z79.4]     HTN (hypertension), malignant [I10] 03/26/2012    HTN (hypertension) [I10] 07/03/2011       48 Y F with a h/o HTN and DM1 diagnosed at age 23 and complicated by many admissions for DKA, with previous kidney and pancreas transplant in 2002 but long since has been back on HD presents to the ED with a couple days of worsening nausea, lethargy, and malaise, to the point that she felt too ill to go to her HD session today. In the ED her glucose was 928 and she had an anion gap including a bicarb of 11. She has a h/o poor compliance and upon admission she could not tell me the doses of her insulins.          Nausea, malaise, and acute metabolic encephalopathy due to DKA, in the setting of DM1  - insulin gtt weaned off. Needed HD to correct acidosis. Did not order the IVFs portion of the DKA protocol, as she was not hypovolemic because there had been no glucosuria. DKA resolved and she was transitioned back to SC insulins.   - also has h/o IBS. Initially held eluxadoline because this can cause pancreatitis and her lipase was significantly elevated on admission (though this was probably just reactive). She denied abdominal pain. Ok to resume this med upon discharge. - her children have been sick, clinically a GI virus seemed less likely however, supportive care. - Had esophagitis in 2016 on EGD biopsy, also h/o PUD and GERD.   Continue PPI.     HTN  - nifedipine, metoprolol, doxazosin, clonidine     ESRD on HD MWF  - nephrology consulted, patient dialyzed

## 2018-09-29 NOTE — PROGRESS NOTES
Pt has no complaints and is resting in bed comfortably at this time. Report given to oncoming nurse. Will continue to monitor.

## 2018-09-30 LAB
ANION GAP SERPL CALCULATED.3IONS-SCNC: 11 MMOL/L (ref 3–16)
BUN BLDV-MCNC: 26 MG/DL (ref 7–20)
CALCIUM SERPL-MCNC: 9.7 MG/DL (ref 8.3–10.6)
CHLORIDE BLD-SCNC: 97 MMOL/L (ref 99–110)
CO2: 26 MMOL/L (ref 21–32)
CREAT SERPL-MCNC: 4.3 MG/DL (ref 0.6–1.1)
GFR AFRICAN AMERICAN: 13
GFR NON-AFRICAN AMERICAN: 11
GLUCOSE BLD-MCNC: 125 MG/DL (ref 70–99)
GLUCOSE BLD-MCNC: 172 MG/DL (ref 70–99)
GLUCOSE BLD-MCNC: 211 MG/DL (ref 70–99)
GLUCOSE BLD-MCNC: 323 MG/DL (ref 70–99)
GLUCOSE BLD-MCNC: 343 MG/DL (ref 70–99)
GLUCOSE BLD-MCNC: 64 MG/DL (ref 70–99)
GLUCOSE BLD-MCNC: 67 MG/DL (ref 70–99)
GLUCOSE BLD-MCNC: 75 MG/DL (ref 70–99)
HCT VFR BLD CALC: 31.1 % (ref 36–48)
HEMOGLOBIN: 10.5 G/DL (ref 12–16)
MCH RBC QN AUTO: 31.3 PG (ref 26–34)
MCHC RBC AUTO-ENTMCNC: 33.7 G/DL (ref 31–36)
MCV RBC AUTO: 93.1 FL (ref 80–100)
PDW BLD-RTO: 14.6 % (ref 12.4–15.4)
PERFORMED ON: ABNORMAL
PERFORMED ON: NORMAL
PLATELET # BLD: 79 K/UL (ref 135–450)
PMV BLD AUTO: 9.2 FL (ref 5–10.5)
POTASSIUM REFLEX MAGNESIUM: 3.9 MMOL/L (ref 3.5–5.1)
RBC # BLD: 3.34 M/UL (ref 4–5.2)
SODIUM BLD-SCNC: 134 MMOL/L (ref 136–145)
WBC # BLD: 5.2 K/UL (ref 4–11)

## 2018-09-30 PROCEDURE — 97167 OT EVAL HIGH COMPLEX 60 MIN: CPT

## 2018-09-30 PROCEDURE — 1200000000 HC SEMI PRIVATE

## 2018-09-30 PROCEDURE — 6370000000 HC RX 637 (ALT 250 FOR IP): Performed by: INTERNAL MEDICINE

## 2018-09-30 PROCEDURE — 36415 COLL VENOUS BLD VENIPUNCTURE: CPT

## 2018-09-30 PROCEDURE — 80048 BASIC METABOLIC PNL TOTAL CA: CPT

## 2018-09-30 PROCEDURE — 97530 THERAPEUTIC ACTIVITIES: CPT

## 2018-09-30 PROCEDURE — 85027 COMPLETE CBC AUTOMATED: CPT

## 2018-09-30 PROCEDURE — 97535 SELF CARE MNGMENT TRAINING: CPT

## 2018-09-30 PROCEDURE — 6370000000 HC RX 637 (ALT 250 FOR IP): Performed by: NURSE PRACTITIONER

## 2018-09-30 PROCEDURE — 83036 HEMOGLOBIN GLYCOSYLATED A1C: CPT

## 2018-09-30 PROCEDURE — G8987 SELF CARE CURRENT STATUS: HCPCS

## 2018-09-30 PROCEDURE — 6360000002 HC RX W HCPCS: Performed by: INTERNAL MEDICINE

## 2018-09-30 PROCEDURE — G8988 SELF CARE GOAL STATUS: HCPCS

## 2018-09-30 RX ORDER — DOXAZOSIN 2 MG/1
2 TABLET ORAL ONCE
Status: COMPLETED | OUTPATIENT
Start: 2018-09-30 | End: 2018-09-30

## 2018-09-30 RX ORDER — DOXAZOSIN 2 MG/1
4 TABLET ORAL NIGHTLY
Status: DISCONTINUED | OUTPATIENT
Start: 2018-09-30 | End: 2018-10-01 | Stop reason: HOSPADM

## 2018-09-30 RX ORDER — INSULIN GLARGINE 100 [IU]/ML
15 INJECTION, SOLUTION SUBCUTANEOUS NIGHTLY
Status: DISCONTINUED | OUTPATIENT
Start: 2018-09-30 | End: 2018-10-01

## 2018-09-30 RX ORDER — LOPERAMIDE HYDROCHLORIDE 2 MG/1
2 CAPSULE ORAL 4 TIMES DAILY PRN
Status: DISCONTINUED | OUTPATIENT
Start: 2018-09-30 | End: 2018-10-01 | Stop reason: HOSPADM

## 2018-09-30 RX ADMIN — LOPERAMIDE HYDROCHLORIDE 2 MG: 2 CAPSULE ORAL at 23:53

## 2018-09-30 RX ADMIN — CLONIDINE HYDROCHLORIDE 0.2 MG: 0.1 TABLET ORAL at 14:10

## 2018-09-30 RX ADMIN — LOPERAMIDE HYDROCHLORIDE 2 MG: 2 CAPSULE ORAL at 09:27

## 2018-09-30 RX ADMIN — NIFEDIPINE 90 MG: 30 TABLET, FILM COATED, EXTENDED RELEASE ORAL at 09:18

## 2018-09-30 RX ADMIN — NIFEDIPINE 90 MG: 30 TABLET, FILM COATED, EXTENDED RELEASE ORAL at 21:03

## 2018-09-30 RX ADMIN — PROCHLORPERAZINE EDISYLATE 10 MG: 5 INJECTION INTRAMUSCULAR; INTRAVENOUS at 12:47

## 2018-09-30 RX ADMIN — CLONIDINE HYDROCHLORIDE 0.2 MG: 0.1 TABLET ORAL at 09:17

## 2018-09-30 RX ADMIN — METOPROLOL TARTRATE 100 MG: 50 TABLET ORAL at 09:18

## 2018-09-30 RX ADMIN — HEPARIN SODIUM 5000 UNITS: 5000 INJECTION INTRAVENOUS; SUBCUTANEOUS at 06:29

## 2018-09-30 RX ADMIN — INSULIN LISPRO 5 UNITS: 100 INJECTION, SOLUTION INTRAVENOUS; SUBCUTANEOUS at 12:28

## 2018-09-30 RX ADMIN — LEVOTHYROXINE SODIUM 25 MCG: 25 TABLET ORAL at 06:29

## 2018-09-30 RX ADMIN — CALCIUM ACETATE 2001 MG: 667 CAPSULE ORAL at 17:57

## 2018-09-30 RX ADMIN — DOXAZOSIN 2 MG: 2 TABLET ORAL at 09:18

## 2018-09-30 RX ADMIN — LOPERAMIDE HYDROCHLORIDE 2 MG: 2 CAPSULE ORAL at 14:11

## 2018-09-30 RX ADMIN — DOXAZOSIN 4 MG: 2 TABLET ORAL at 21:03

## 2018-09-30 RX ADMIN — INSULIN LISPRO 2 UNITS: 100 INJECTION, SOLUTION INTRAVENOUS; SUBCUTANEOUS at 21:07

## 2018-09-30 RX ADMIN — INSULIN LISPRO 5 UNITS: 100 INJECTION, SOLUTION INTRAVENOUS; SUBCUTANEOUS at 09:27

## 2018-09-30 RX ADMIN — CALCIUM ACETATE 2001 MG: 667 CAPSULE ORAL at 12:24

## 2018-09-30 RX ADMIN — CINACALCET HYDROCHLORIDE 30 MG: 30 TABLET, COATED ORAL at 09:18

## 2018-09-30 RX ADMIN — CALCIUM ACETATE 2001 MG: 667 CAPSULE ORAL at 09:18

## 2018-09-30 RX ADMIN — PANTOPRAZOLE SODIUM 40 MG: 40 TABLET, DELAYED RELEASE ORAL at 06:29

## 2018-09-30 RX ADMIN — CLONIDINE HYDROCHLORIDE 0.2 MG: 0.1 TABLET ORAL at 21:03

## 2018-09-30 RX ADMIN — LOPERAMIDE HYDROCHLORIDE 2 MG: 2 CAPSULE ORAL at 18:16

## 2018-09-30 RX ADMIN — INSULIN GLARGINE 15 UNITS: 100 INJECTION, SOLUTION SUBCUTANEOUS at 21:08

## 2018-09-30 RX ADMIN — INSULIN LISPRO 2 UNITS: 100 INJECTION, SOLUTION INTRAVENOUS; SUBCUTANEOUS at 12:28

## 2018-09-30 RX ADMIN — OXYCODONE HYDROCHLORIDE 10 MG: 5 TABLET ORAL at 21:03

## 2018-09-30 RX ADMIN — METOPROLOL TARTRATE 100 MG: 50 TABLET ORAL at 21:03

## 2018-09-30 RX ADMIN — INSULIN LISPRO 8 UNITS: 100 INJECTION, SOLUTION INTRAVENOUS; SUBCUTANEOUS at 09:23

## 2018-09-30 ASSESSMENT — PAIN SCALES - GENERAL: PAINLEVEL_OUTOF10: 7

## 2018-09-30 NOTE — PROGRESS NOTES
Level: Minimal assistance  Functional Mobility Comments: Min A for steady assist and to manage RW  ADL  Feeding: Independent  Grooming: Setup (washed face and hands EOB)  LE Dressing: Supervision (socks)  Tone RUE  RUE Tone: Normotonic  Tone LUE  LUE Tone: Normotonic  Coordination  Movements Are Fluid And Coordinated: Yes     Bed mobility  Supine to Sit: Supervision  Sit to Supine: Supervision  Comment: HOB elevated and use of bed rails  Transfers  Sit to stand: Minimal assistance  Stand to sit: Minimal assistance  Transfer Comments: RW     Cognition  Overall Cognitive Status: Exceptions  Arousal/Alertness: Appropriate responses to stimuli  Following Commands: Follows one step commands with increased time  Attention Span: Attends with cues to redirect  Memory: Appears intact  Problem Solving: Assistance required to generate solutions  Initiation: Requires cues for some  Sequencing: Requires cues for some        Sensation  Overall Sensation Status: WFL        LUE AROM (degrees)  LUE AROM : WFL  RUE AROM (degrees)  RUE AROM : WFL  LUE Strength  Gross LUE Strength: WFL  L Hand Grasp: 5/5  L Hand Release: 5/5  RUE Strength  Gross RUE Strength: WFL  R Hand Grasp: 5/5  R Hand Release: 5/5     Hand Dominance  Hand Dominance: Right       Assessment   Performance deficits / Impairments: Decreased functional mobility ; Decreased ADL status; Decreased safe awareness;Decreased cognition;Decreased balance;Decreased sensation;Decreased endurance  Assessment: OT eval complete. Pt presents with the above deficits impacting independence with ADLs and functional t/fs. Recommend continued skilled OT to increase independence and safety prior to return home.  Recommend DC IP Rehab, pt agreeable stating \"I need to improve my balance, I know it's off\"Cont POC  Prognosis: Good  Decision Making: High Complexity  Patient Education: role of OT, safe t/fs, bed mobility, ADLs, DC recommendations   REQUIRES OT FOLLOW UP: Yes  Activity

## 2018-09-30 NOTE — PROGRESS NOTES
Hospitalist Progress Note      PCP: Zechariah Hilliard MD    Date of Admission: 9/28/2018    Chief Complaint: nausea    Hospital Course: 50 Y F with a h/o HTN and DM1 diagnosed at age 23 and complicated by many admissions for DKA, with previous kidney and pancreas transplant in 2002 but long since has been back on HD presents to the ED with a couple days of worsening nausea, lethargy, and malaise, to the point that she felt too ill to go to her HD session today. In the ED her glucose was 928 and she had an anion gap including a bicarb of 11. She has a h/o poor compliance and upon admission she could not tell me the doses of her insulins. Subjective:  Glucose out of control again. Yesterday we talked about her insulin regimen for a long time and she told me for example that if her blood sugar was 150 and she was going to eat a sandwhich she would give herself 2u of insulin. Today she says that she would normally take 5-7 units of insulin in this situation. She had to think long and hard about this answer and it didn't really seem like she has any definite routine or system. Increased insulins, she must stay another night.         Medications:  Reviewed    Infusion Medications    dextrose 5 % and 0.45 % NaCl      dextrose       Scheduled Medications    doxazosin  4 mg Oral Nightly    doxazosin  2 mg Oral Once    insulin lispro  5 Units Subcutaneous TID WC    insulin glargine  15 Units Subcutaneous Nightly    influenza virus vaccine  0.5 mL Intramuscular Once    insulin lispro  0-12 Units Subcutaneous TID WC    insulin lispro  0-6 Units Subcutaneous Nightly    calcium acetate  2,001 mg Oral TID WC    cloNIDine  0.2 mg Oral TID    levothyroxine  25 mcg Oral Daily    metoprolol  100 mg Oral BID    NIFEdipine  90 mg Oral BID    cinacalcet  30 mg Oral Daily    heparin (porcine)  5,000 Units Subcutaneous 3 times per day    calcium carbonate  500 mg Oral Once    pantoprazole  40 mg also has h/o IBS. Initially held eluxadoline because this can cause pancreatitis and her lipase was significantly elevated on admission (though this was probably just reactive). She denied abdominal pain. Ok to resume this med upon discharge. - her children have been sick, clinically a GI virus seemed less likely however, supportive care. - Had esophagitis in 2016 on EGD biopsy, also h/o PUD and GERD. Continue PPI.     HTN  - nifedipine, metoprolol, doxazosin, clonidine     ESRD on HD MWF  - nephrology consulted, patient dialyzed here per usual schedule.      Chronic troponin elevation  - likely related to HTN and ESRD. No chest pain, EKG not suggestive of ischemia.       Hypothyroidism  - Clinically euthyroid. Continue home dose of levothyroxine. DVT Prophylaxis: SCDs due to chronic thrombocytopenia  Diet: DIET CARB CONTROL; Carb Control: 4 carb choices (60 gms)/meal  Code Status: Full Code    PT/OT Eval Status: rec'd IP rehab    Dispo - perhaps ready 10/1 if sugar and HTN better controlled. She lives at home.        Maria De Jesus De Santiago MD

## 2018-10-01 ENCOUNTER — HOSPITAL ENCOUNTER (INPATIENT)
Age: 48
LOS: 10 days | Discharge: HOME HEALTH | DRG: 070 | End: 2018-10-11
Attending: PHYSICAL MEDICINE & REHABILITATION | Admitting: PHYSICAL MEDICINE & REHABILITATION
Payer: MEDICARE

## 2018-10-01 VITALS
WEIGHT: 128.31 LBS | SYSTOLIC BLOOD PRESSURE: 148 MMHG | HEART RATE: 80 BPM | HEIGHT: 64 IN | DIASTOLIC BLOOD PRESSURE: 74 MMHG | BODY MASS INDEX: 21.91 KG/M2 | RESPIRATION RATE: 16 BRPM | TEMPERATURE: 99.4 F | OXYGEN SATURATION: 98 %

## 2018-10-01 LAB
ANION GAP SERPL CALCULATED.3IONS-SCNC: 13 MMOL/L (ref 3–16)
BUN BLDV-MCNC: 41 MG/DL (ref 7–20)
CALCIUM SERPL-MCNC: 9.5 MG/DL (ref 8.3–10.6)
CHLORIDE BLD-SCNC: 93 MMOL/L (ref 99–110)
CO2: 26 MMOL/L (ref 21–32)
CREAT SERPL-MCNC: 5.8 MG/DL (ref 0.6–1.1)
ESTIMATED AVERAGE GLUCOSE: 277.6 MG/DL
GFR AFRICAN AMERICAN: 9
GFR NON-AFRICAN AMERICAN: 8
GLUCOSE BLD-MCNC: 127 MG/DL (ref 70–99)
GLUCOSE BLD-MCNC: 239 MG/DL (ref 70–99)
GLUCOSE BLD-MCNC: 244 MG/DL (ref 70–99)
GLUCOSE BLD-MCNC: 268 MG/DL (ref 70–99)
GLUCOSE BLD-MCNC: 281 MG/DL (ref 70–99)
HBA1C MFR BLD: 11.3 %
HCT VFR BLD CALC: 31.1 % (ref 36–48)
HEMOGLOBIN: 10.5 G/DL (ref 12–16)
HEPATITIS B CORE TOTAL ANTIBODY: NEGATIVE
MCH RBC QN AUTO: 31.2 PG (ref 26–34)
MCHC RBC AUTO-ENTMCNC: 33.7 G/DL (ref 31–36)
MCV RBC AUTO: 92.3 FL (ref 80–100)
PDW BLD-RTO: 14.3 % (ref 12.4–15.4)
PERFORMED ON: ABNORMAL
PLATELET # BLD: 86 K/UL (ref 135–450)
PMV BLD AUTO: 9.2 FL (ref 5–10.5)
POTASSIUM REFLEX MAGNESIUM: 4.2 MMOL/L (ref 3.5–5.1)
RBC # BLD: 3.37 M/UL (ref 4–5.2)
SODIUM BLD-SCNC: 132 MMOL/L (ref 136–145)
WBC # BLD: 6.2 K/UL (ref 4–11)

## 2018-10-01 PROCEDURE — 90937 HEMODIALYSIS REPEATED EVAL: CPT

## 2018-10-01 PROCEDURE — 1280000000 HC REHAB R&B

## 2018-10-01 PROCEDURE — 80048 BASIC METABOLIC PNL TOTAL CA: CPT

## 2018-10-01 PROCEDURE — 6370000000 HC RX 637 (ALT 250 FOR IP): Performed by: PHYSICAL MEDICINE & REHABILITATION

## 2018-10-01 PROCEDURE — 85027 COMPLETE CBC AUTOMATED: CPT

## 2018-10-01 PROCEDURE — 6370000000 HC RX 637 (ALT 250 FOR IP): Performed by: INTERNAL MEDICINE

## 2018-10-01 RX ORDER — ONDANSETRON 4 MG/1
8 TABLET, ORALLY DISINTEGRATING ORAL EVERY 8 HOURS PRN
Status: DISCONTINUED | OUTPATIENT
Start: 2018-10-01 | End: 2018-10-04

## 2018-10-01 RX ORDER — DEXTROSE MONOHYDRATE 25 G/50ML
12.5 INJECTION, SOLUTION INTRAVENOUS PRN
Status: DISCONTINUED | OUTPATIENT
Start: 2018-10-01 | End: 2018-10-01 | Stop reason: SDUPTHER

## 2018-10-01 RX ORDER — OXYCODONE HYDROCHLORIDE 5 MG/1
5 TABLET ORAL EVERY 4 HOURS PRN
Status: DISCONTINUED | OUTPATIENT
Start: 2018-10-01 | End: 2018-10-11 | Stop reason: HOSPADM

## 2018-10-01 RX ORDER — DIPHENHYDRAMINE HCL 25 MG
25 TABLET ORAL NIGHTLY PRN
Status: CANCELLED | OUTPATIENT
Start: 2018-10-01

## 2018-10-01 RX ORDER — OXYCODONE HYDROCHLORIDE 5 MG/1
10 TABLET ORAL EVERY 4 HOURS PRN
Status: CANCELLED | OUTPATIENT
Start: 2018-10-01

## 2018-10-01 RX ORDER — DOCUSATE SODIUM 100 MG/1
100 CAPSULE, LIQUID FILLED ORAL 2 TIMES DAILY
Status: CANCELLED | OUTPATIENT
Start: 2018-10-01

## 2018-10-01 RX ORDER — OXYCODONE HYDROCHLORIDE 5 MG/1
10 TABLET ORAL EVERY 4 HOURS PRN
Status: DISCONTINUED | OUTPATIENT
Start: 2018-10-01 | End: 2018-10-11 | Stop reason: HOSPADM

## 2018-10-01 RX ORDER — DOCUSATE SODIUM 100 MG/1
100 CAPSULE, LIQUID FILLED ORAL 2 TIMES DAILY
Status: DISCONTINUED | OUTPATIENT
Start: 2018-10-01 | End: 2018-10-11 | Stop reason: HOSPADM

## 2018-10-01 RX ORDER — INSULIN GLARGINE 100 [IU]/ML
20 INJECTION, SOLUTION SUBCUTANEOUS
Qty: 1 VIAL | Refills: 0 | Status: ON HOLD | OUTPATIENT
Start: 2018-10-01 | End: 2018-10-10 | Stop reason: HOSPADM

## 2018-10-01 RX ORDER — DEXTROSE MONOHYDRATE 50 MG/ML
100 INJECTION, SOLUTION INTRAVENOUS PRN
Status: DISCONTINUED | OUTPATIENT
Start: 2018-10-01 | End: 2018-10-01 | Stop reason: HOSPADM

## 2018-10-01 RX ORDER — NICOTINE POLACRILEX 4 MG
15 LOZENGE BUCCAL PRN
Status: DISCONTINUED | OUTPATIENT
Start: 2018-10-01 | End: 2018-10-11 | Stop reason: HOSPADM

## 2018-10-01 RX ORDER — DEXTROSE MONOHYDRATE 25 G/50ML
12.5 INJECTION, SOLUTION INTRAVENOUS PRN
Status: CANCELLED | OUTPATIENT
Start: 2018-10-01

## 2018-10-01 RX ORDER — CLONIDINE HYDROCHLORIDE 0.1 MG/1
0.2 TABLET ORAL 3 TIMES DAILY
Status: DISCONTINUED | OUTPATIENT
Start: 2018-10-01 | End: 2018-10-11 | Stop reason: HOSPADM

## 2018-10-01 RX ORDER — NIFEDIPINE 30 MG/1
90 TABLET, FILM COATED, EXTENDED RELEASE ORAL 2 TIMES DAILY
Status: DISCONTINUED | OUTPATIENT
Start: 2018-10-01 | End: 2018-10-11 | Stop reason: HOSPADM

## 2018-10-01 RX ORDER — DIPHENHYDRAMINE HCL 25 MG
25 TABLET ORAL NIGHTLY PRN
Status: DISCONTINUED | OUTPATIENT
Start: 2018-10-01 | End: 2018-10-06

## 2018-10-01 RX ORDER — TRAZODONE HYDROCHLORIDE 50 MG/1
50 TABLET ORAL NIGHTLY PRN
Status: DISCONTINUED | OUTPATIENT
Start: 2018-10-01 | End: 2018-10-04

## 2018-10-01 RX ORDER — CINACALCET 30 MG/1
30 TABLET, FILM COATED ORAL DAILY
Status: CANCELLED | OUTPATIENT
Start: 2018-10-02

## 2018-10-01 RX ORDER — OXYCODONE HYDROCHLORIDE 10 MG/1
10 TABLET ORAL EVERY 8 HOURS PRN
Qty: 14 TABLET | Refills: 0 | Status: ON HOLD
Start: 2018-10-01 | End: 2018-10-10 | Stop reason: HOSPADM

## 2018-10-01 RX ORDER — PANTOPRAZOLE SODIUM 40 MG/1
40 TABLET, DELAYED RELEASE ORAL
Status: CANCELLED | OUTPATIENT
Start: 2018-10-02

## 2018-10-01 RX ORDER — PANTOPRAZOLE SODIUM 40 MG/1
40 TABLET, DELAYED RELEASE ORAL
Status: DISCONTINUED | OUTPATIENT
Start: 2018-10-02 | End: 2018-10-11 | Stop reason: HOSPADM

## 2018-10-01 RX ORDER — DEXTROSE MONOHYDRATE 50 MG/ML
100 INJECTION, SOLUTION INTRAVENOUS PRN
Status: CANCELLED | OUTPATIENT
Start: 2018-10-01

## 2018-10-01 RX ORDER — DEXTROSE MONOHYDRATE 25 G/50ML
12.5 INJECTION, SOLUTION INTRAVENOUS PRN
Status: DISCONTINUED | OUTPATIENT
Start: 2018-10-01 | End: 2018-10-11 | Stop reason: HOSPADM

## 2018-10-01 RX ORDER — LOPERAMIDE HYDROCHLORIDE 2 MG/1
2 CAPSULE ORAL 4 TIMES DAILY PRN
Status: CANCELLED | OUTPATIENT
Start: 2018-10-01

## 2018-10-01 RX ORDER — ACETAMINOPHEN 325 MG/1
650 TABLET ORAL EVERY 4 HOURS PRN
Status: DISCONTINUED | OUTPATIENT
Start: 2018-10-01 | End: 2018-10-11 | Stop reason: HOSPADM

## 2018-10-01 RX ORDER — CLONIDINE HYDROCHLORIDE 0.1 MG/1
0.2 TABLET ORAL 3 TIMES DAILY
Status: CANCELLED | OUTPATIENT
Start: 2018-10-01

## 2018-10-01 RX ORDER — DOXAZOSIN 2 MG/1
4 TABLET ORAL NIGHTLY
Status: DISCONTINUED | OUTPATIENT
Start: 2018-10-01 | End: 2018-10-11 | Stop reason: HOSPADM

## 2018-10-01 RX ORDER — INSULIN GLARGINE 100 [IU]/ML
20 INJECTION, SOLUTION SUBCUTANEOUS NIGHTLY
Status: CANCELLED | OUTPATIENT
Start: 2018-10-01

## 2018-10-01 RX ORDER — METOPROLOL TARTRATE 50 MG/1
100 TABLET, FILM COATED ORAL 2 TIMES DAILY
Status: DISCONTINUED | OUTPATIENT
Start: 2018-10-01 | End: 2018-10-11 | Stop reason: HOSPADM

## 2018-10-01 RX ORDER — ACETAMINOPHEN 325 MG/1
650 TABLET ORAL EVERY 4 HOURS PRN
Status: CANCELLED | OUTPATIENT
Start: 2018-10-01

## 2018-10-01 RX ORDER — TRAZODONE HYDROCHLORIDE 50 MG/1
50 TABLET ORAL NIGHTLY PRN
Status: CANCELLED | OUTPATIENT
Start: 2018-10-01

## 2018-10-01 RX ORDER — DEXTROSE MONOHYDRATE 50 MG/ML
100 INJECTION, SOLUTION INTRAVENOUS PRN
Status: DISCONTINUED | OUTPATIENT
Start: 2018-10-01 | End: 2018-10-11 | Stop reason: HOSPADM

## 2018-10-01 RX ORDER — LEVOTHYROXINE SODIUM 0.03 MG/1
25 TABLET ORAL DAILY
Status: CANCELLED | OUTPATIENT
Start: 2018-10-02

## 2018-10-01 RX ORDER — DOXAZOSIN 2 MG/1
4 TABLET ORAL NIGHTLY
Status: CANCELLED | OUTPATIENT
Start: 2018-10-01

## 2018-10-01 RX ORDER — NICOTINE POLACRILEX 4 MG
15 LOZENGE BUCCAL PRN
Status: DISCONTINUED | OUTPATIENT
Start: 2018-10-01 | End: 2018-10-01 | Stop reason: HOSPADM

## 2018-10-01 RX ORDER — LOPERAMIDE HYDROCHLORIDE 2 MG/1
2 CAPSULE ORAL 4 TIMES DAILY PRN
Status: DISCONTINUED | OUTPATIENT
Start: 2018-10-01 | End: 2018-10-11 | Stop reason: HOSPADM

## 2018-10-01 RX ORDER — INSULIN GLARGINE 100 [IU]/ML
20 INJECTION, SOLUTION SUBCUTANEOUS NIGHTLY
Status: DISCONTINUED | OUTPATIENT
Start: 2018-10-01 | End: 2018-10-01 | Stop reason: HOSPADM

## 2018-10-01 RX ORDER — INSULIN GLARGINE 100 [IU]/ML
20 INJECTION, SOLUTION SUBCUTANEOUS NIGHTLY
Status: DISCONTINUED | OUTPATIENT
Start: 2018-10-01 | End: 2018-10-03

## 2018-10-01 RX ORDER — NICOTINE POLACRILEX 4 MG
15 LOZENGE BUCCAL PRN
Status: CANCELLED | OUTPATIENT
Start: 2018-10-01

## 2018-10-01 RX ORDER — ONDANSETRON 4 MG/1
8 TABLET, ORALLY DISINTEGRATING ORAL EVERY 8 HOURS PRN
Status: CANCELLED | OUTPATIENT
Start: 2018-10-01

## 2018-10-01 RX ORDER — OXYCODONE HYDROCHLORIDE 5 MG/1
5 TABLET ORAL EVERY 4 HOURS PRN
Status: CANCELLED | OUTPATIENT
Start: 2018-10-01

## 2018-10-01 RX ORDER — METOPROLOL TARTRATE 50 MG/1
100 TABLET, FILM COATED ORAL 2 TIMES DAILY
Status: CANCELLED | OUTPATIENT
Start: 2018-10-01

## 2018-10-01 RX ORDER — CINACALCET 30 MG/1
30 TABLET, FILM COATED ORAL DAILY
Status: DISCONTINUED | OUTPATIENT
Start: 2018-10-02 | End: 2018-10-11 | Stop reason: HOSPADM

## 2018-10-01 RX ORDER — NIFEDIPINE 30 MG/1
90 TABLET, FILM COATED, EXTENDED RELEASE ORAL 2 TIMES DAILY
Status: CANCELLED | OUTPATIENT
Start: 2018-10-01

## 2018-10-01 RX ORDER — LEVOTHYROXINE SODIUM 0.03 MG/1
25 TABLET ORAL DAILY
Status: DISCONTINUED | OUTPATIENT
Start: 2018-10-02 | End: 2018-10-11 | Stop reason: HOSPADM

## 2018-10-01 RX ADMIN — CINACALCET HYDROCHLORIDE 30 MG: 30 TABLET, COATED ORAL at 13:39

## 2018-10-01 RX ADMIN — PANTOPRAZOLE SODIUM 40 MG: 40 TABLET, DELAYED RELEASE ORAL at 06:58

## 2018-10-01 RX ADMIN — CLONIDINE HYDROCHLORIDE 0.2 MG: 0.1 TABLET ORAL at 13:39

## 2018-10-01 RX ADMIN — CALCIUM ACETATE 2001 MG: 667 CAPSULE ORAL at 13:39

## 2018-10-01 RX ADMIN — NIFEDIPINE 90 MG: 30 TABLET, FILM COATED, EXTENDED RELEASE ORAL at 20:28

## 2018-10-01 RX ADMIN — OXYCODONE HYDROCHLORIDE 10 MG: 5 TABLET ORAL at 13:38

## 2018-10-01 RX ADMIN — NIFEDIPINE 90 MG: 30 TABLET, FILM COATED, EXTENDED RELEASE ORAL at 13:39

## 2018-10-01 RX ADMIN — INSULIN LISPRO 2 UNITS: 100 INJECTION, SOLUTION INTRAVENOUS; SUBCUTANEOUS at 18:32

## 2018-10-01 RX ADMIN — INSULIN GLARGINE 20 UNITS: 100 INJECTION, SOLUTION SUBCUTANEOUS at 21:21

## 2018-10-01 RX ADMIN — INSULIN LISPRO 4 UNITS: 100 INJECTION, SOLUTION INTRAVENOUS; SUBCUTANEOUS at 18:33

## 2018-10-01 RX ADMIN — METOPROLOL TARTRATE 100 MG: 50 TABLET ORAL at 13:39

## 2018-10-01 RX ADMIN — CLONIDINE HYDROCHLORIDE 0.2 MG: 0.1 TABLET ORAL at 20:28

## 2018-10-01 RX ADMIN — OXYCODONE HYDROCHLORIDE 10 MG: 5 TABLET ORAL at 01:23

## 2018-10-01 RX ADMIN — DOXAZOSIN 4 MG: 2 TABLET ORAL at 20:28

## 2018-10-01 RX ADMIN — OXYCODONE HYDROCHLORIDE 10 MG: 5 TABLET ORAL at 20:29

## 2018-10-01 RX ADMIN — METOPROLOL TARTRATE 100 MG: 50 TABLET ORAL at 20:28

## 2018-10-01 RX ADMIN — LOPERAMIDE HYDROCHLORIDE 2 MG: 2 CAPSULE ORAL at 18:31

## 2018-10-01 RX ADMIN — LOPERAMIDE HYDROCHLORIDE 2 MG: 2 CAPSULE ORAL at 13:38

## 2018-10-01 RX ADMIN — LEVOTHYROXINE SODIUM 25 MCG: 25 TABLET ORAL at 06:58

## 2018-10-01 RX ADMIN — INSULIN LISPRO 2 UNITS: 100 INJECTION, SOLUTION INTRAVENOUS; SUBCUTANEOUS at 21:22

## 2018-10-01 RX ADMIN — CALCIUM ACETATE 2001 MG: 667 CAPSULE ORAL at 18:31

## 2018-10-01 ASSESSMENT — PAIN DESCRIPTION - LOCATION
LOCATION: LEG;FOOT
LOCATION: LEG;FOOT

## 2018-10-01 ASSESSMENT — PAIN SCALES - GENERAL
PAINLEVEL_OUTOF10: 8
PAINLEVEL_OUTOF10: 7
PAINLEVEL_OUTOF10: 0
PAINLEVEL_OUTOF10: 0
PAINLEVEL_OUTOF10: 5
PAINLEVEL_OUTOF10: 0
PAINLEVEL_OUTOF10: 0
PAINLEVEL_OUTOF10: 8
PAINLEVEL_OUTOF10: 6

## 2018-10-01 ASSESSMENT — PAIN DESCRIPTION - PAIN TYPE
TYPE: ACUTE PAIN
TYPE: ACUTE PAIN

## 2018-10-01 ASSESSMENT — PAIN DESCRIPTION - ORIENTATION: ORIENTATION: RIGHT;LEFT

## 2018-10-01 NOTE — PLAN OF CARE
ARU PATIENT TREATMENT PLAN  [address] [place] [address] [address]   [phone]    Cristal Wing    : 1970  Acct #: [de-identified]  MRN: [id number]   PHYSICIAN:  Christiane Stout MD  Primary Problem    Patient Active Problem List   Diagnosis    Encephalopathy    HTN (hypertension)    ESRD (end stage renal disease) [facility] ([facility]    Hyperkalemia    HTN (hypertension), malignant    Upper airway obstruction    Respiratory failure ([address]    Vocal cord mass    Metabolic encephalopathy    Altered mental state    Hyponatremia    Fever    Closed left ankle fracture    DKA, type 1 ([address]    Diabetic ketoacidosis without coma ([facility] [facility]    Upper gastrointestinal bleeding    Lactic acidosis    Acute gastritis with hemorrhage    Type 1 diabetes mellitus with diabetic chronic kidney disease ([facility] [facility]    Closed fracture of left tibia and fibula    Fall at home    Acute encephalopathy    Acute respiratory failure with hypoxia (HCC)    Encephalopathy, hypertensive    Encephalopathy, metabolic    XL0/ZHVR    Pancreatic insufficiency    Hypertensive urgency    Thrombocytopenia (HCC)    GERD/PUD with hx of upper GI bleed    History of kidney transplant    H/O pancreas transplant ([facility]    Hypothyroidism    Diabetic peripheral neuropathy (HCC)    IBSd on Viber[employer]    Nausea vomiting and diarrhea    DKA, type 1, not at goal [facility])       Rehabilitation Diagnosis:     DKA, type 1, not at goal [facility]) [E10.10]       ADMIT DATE:10/1/2018    Patient Goals: To go home    Admitting Impairments: Decreased endurance, weakness, decreased balance, cognition  Barriers: decreased endurance, weakness, decreased balance, cognition, medical comorbidities  Participation: Currently unable to transfer or ambulate independently, perform ADLs as she was doing prior to admission.      CARE PLAN     NURSING:  Cristal Wing while on this unit will:     [x] Be continent of bowel and bladder     [x] Have an adequate number of bowel movements  [] Urinate with no urinary retention >300ml in bladder  [] Complete bladder protocol with rendon removal  [] Maintain O2 SATs at ___%  [x] Have pain managed while on ARU       [] Be pain free by discharge   [] Have no skin breakdown while on ARU  [x] Have improved skin integrity via wound measurements  [x] Have no signs/symptoms of infection at the wound site  [x] Be free from injury during hospitalization   [x] Complete education with patient/family with understanding demonstrated for:  [] Adjustment   [] Other:   Nursing interventions may include bowel/bladder training, education for medical assistive devices, medication education, O2 saturation management, energy conservation, stress management techniques, fall prevention, alarms protocol, seating and positioning, skin/wound care, pressure relief instruction,dressing changes,  infection protection, DVT prophylaxis, and/or assistance with in room safety with transfers to bed, toilet, wheelchair, shower as well as bathroom activities and hygiene.      Patient/caregiver education for:   [x] Disease/sustained injury/management      [x] Medication Use   [] Surgical intervention   [x] Safety   [] Body mechanics and or joint protection   [x] Health maintenance         PHYSICAL THERAPY:  Goals:                  Short term goals  Time Frame for Short term goals: 5 days  Short term goal 1: Pt will transfer supine to/from sit with supervision  Short term goal 2: Pt will transfer sit to/from stand with SBA  Short term goal 3: Pt will ambulate 50ft with SBA using LRAD  Short term goal 4: Pt will ascend/descend 5 steps with SBA using unilateral rail            Long term goals  Time Frame for Long term goals : 10 days  Long term goal 1: Pt will transfer supine to/from sit independently, with HOB flat, no rails  Long term goal 2: Pt will transfer sit to/from stand with mod I up to [address] [facility]. term goal 3: Pt will ambulate 150ft with mod I using LRAD in order to access home and community environments  Long term goal 4: Pt will ascend/descend 1 flight of steps with supervision using unilateral rail in order to access multi-level home  Long term goal 5: Pt will complete car transfer with supervision in order to access dialysis and physician appointments at d/c  These goals were reviewed with this patient at the time of assessment and Leanna Scales is in agreement. Plan of Care: Pt to be seen 5 out of 7 days per week, 90   mins (exact) per day for 10 days (exact)                   Current Treatment Recommendations: Strengthening, Balance Training, Functional Mobility Training, Transfer Training, Gait Training, Stair training, Wheelchair Mobility Training, Endurance Training, Home Exercise Program, Safety Education & Training, Patient/Caregiver Education & Training, Equipment Evaluation, Education, & procurement      OCCUPATIONAL THERAPY:  Goals:             Short term goals  Time Frame for Short term goals: 5 days  Short term goal 1: Pt will perform functional transfers with supervision. Short term goal 2: Pt will complete BADLs with supervision. Short term goal 3: Pt will complete simple meal prep with SBA.   Short term goal 4: Pt will perform mock shopping task and standing for >5 minutes with supervision.  :  Long term goals  Time Frame for Long term goals : 10 days  Long term goal 1: Pt will complete functional transfers with mod I.  Long term goal 2: Pt will perform BADLs with mod I.  Long term goal 3: Pt will complete simple meal prep with mod I.  Long term goal 4: Pt will perform light cleaning with supervision. :    These goals were reviewed with this patient at the time of assessment and Leanna Scales is in agreement    Plan of Care:  Pt to be seen 5 out of 7 days per week, 90  mins (exact) per day for 10 days (exact)  Current Treatment Recommendations: Strengthening, Endurance Training, Patient/Caregiver

## 2018-10-01 NOTE — DISCHARGE SUMMARY
admission (though this was probably just reactive).  She denied abdominal pain. Ok to resume this med upon discharge. - her children have been sick, clinically a GI virus seemed less likely however, supportive care.    - Had esophagitis in 2016 on EGD biopsy, also h/o PUD and GERD.  Continue PPI. No melena. Did well once DKA controlled.      HTN  - nifedipine, metoprolol, doxazosin, clonidine     ESRD on HD MWF  - nephrology consulted, patient dialyzed here per usual schedule.      Chronic troponin elevation  - likely related to HTN and ESRD.  No chest pain, EKG not suggestive of ischemia.  No further workup.     Hypothyroidism  - Clinically euthyroid.  Continue home dose of levothyroxine. Physical Exam Performed:     BP (!) 158/79   Pulse 71   Temp 98.1 °F (36.7 °C) (Oral)   Resp 14   Ht 5' 4\" (1.626 m)   Wt 126 lb 5.2 oz (57.3 kg)   LMP 03/12/2008   SpO2 98%   BMI 21.68 kg/m²       General appearance:  No apparent distress, appears stated age and cooperative. HEENT:  Normal cephalic, atraumatic without obvious deformity. Pupils equal, round, and reactive to light. Extra ocular muscles intact. Conjunctivae/corneas clear. Neck: Supple, with full range of motion. No jugular venous distention. Trachea midline. Respiratory:  Normal respiratory effort. Clear to auscultation, bilaterally without Rales/Wheezes/Rhonchi. Cardiovascular:  Regular rate and rhythm with normal S1/S2 without murmurs, rubs or gallops. Abdomen: Soft, non-tender, non-distended with normal bowel sounds. Musculoskeletal:  No clubbing, cyanosis or edema bilaterally. Full range of motion without deformity. Skin: Skin color, texture, turgor normal.  No rashes or lesions. Neurologic:  Neurovascularly intact without any focal sensory/motor deficits.  Cranial nerves: II-XII intact, grossly non-focal.  Psychiatric:  Alert and oriented, thought content appropriate, normal insight  Capillary Refill: Brisk,< 3 seconds   Peripheral Pulses: +2 palpable, equal bilaterally       Labs: For convenience and continuity at follow-up the following most recent labs are provided:      CBC:    Lab Results   Component Value Date    WBC 6.2 10/01/2018    HGB 10.5 10/01/2018    HCT 31.1 10/01/2018    PLT 86 10/01/2018       Renal:    Lab Results   Component Value Date     09/30/2018    K 3.9 09/30/2018    CL 97 09/30/2018    CO2 26 09/30/2018    BUN 26 09/30/2018    CREATININE 4.3 09/30/2018    CALCIUM 9.7 09/30/2018    PHOS 3.5 09/29/2018         Significant Diagnostic Studies    Radiology:   No orders to display          Consults:     IP CONSULT TO NEPHROLOGY  IP CONSULT TO HOSPITALIST  IP CONSULT TO CRITICAL CARE  IP CONSULT TO CASE MANAGEMENT    Disposition:  home     Condition at Discharge: Stable    Discharge Instructions/Follow-up:  Follow up with PCP within 1-2 weeks. Follow up with endocrinology as soon as possible. Code Status:  Full Code     Activity: activity as tolerated    Diet: diabetic diet      Discharge Medications:     Current Discharge Medication List           Details   insulin glargine (LANTUS) 100 UNIT/ML injection vial Inject 20 Units into the skin every morning (before breakfast)  Qty: 1 vial, Refills: 0    Comments: **PT NEEDS APPT BEFORE NEXT REFILL**      !! insulin lispro (HUMALOG) 100 UNIT/ML injection vial Inject 0-3 Units into the skin nightly  Qty: 1 vial, Refills: 3      !! insulin lispro (HUMALOG) 100 UNIT/ML injection vial Inject 4 Units into the skin 3 times daily (with meals)  Qty: 1 vial, Refills: 3       !! - Potential duplicate medications found. Please discuss with provider. Details   ranitidine (ZANTAC) 300 MG tablet Take 300 mg by mouth nightly      cloNIDine (CATAPRES) 0.2 MG tablet Take 1 tablet by mouth 3 times daily  Qty: 90 tablet, Refills: 0      GABAPENTIN & DIET MANAGE PROD PO Take 200 mg by mouth daily Pt takes 2 100mg pills daily.  On dialysis days, pt will take an additional 100mg

## 2018-10-02 LAB
A/G RATIO: 1 (ref 1.1–2.2)
ALBUMIN SERPL-MCNC: 3.4 G/DL (ref 3.4–5)
ALP BLD-CCNC: 110 U/L (ref 40–129)
ALT SERPL-CCNC: 7 U/L (ref 10–40)
ANION GAP SERPL CALCULATED.3IONS-SCNC: 10 MMOL/L (ref 3–16)
AST SERPL-CCNC: 16 U/L (ref 15–37)
BILIRUB SERPL-MCNC: 0.3 MG/DL (ref 0–1)
BUN BLDV-MCNC: 23 MG/DL (ref 7–20)
CALCIUM SERPL-MCNC: 9.5 MG/DL (ref 8.3–10.6)
CHLORIDE BLD-SCNC: 98 MMOL/L (ref 99–110)
CO2: 28 MMOL/L (ref 21–32)
CREAT SERPL-MCNC: 3.5 MG/DL (ref 0.6–1.1)
EKG ATRIAL RATE: 66 BPM
EKG DIAGNOSIS: NORMAL
EKG P AXIS: 65 DEGREES
EKG P-R INTERVAL: 182 MS
EKG Q-T INTERVAL: 476 MS
EKG QRS DURATION: 98 MS
EKG QTC CALCULATION (BAZETT): 499 MS
EKG R AXIS: 117 DEGREES
EKG T AXIS: 59 DEGREES
EKG VENTRICULAR RATE: 66 BPM
GFR AFRICAN AMERICAN: 17
GFR NON-AFRICAN AMERICAN: 14
GLOBULIN: 3.4 G/DL
GLUCOSE BLD-MCNC: 133 MG/DL (ref 70–99)
GLUCOSE BLD-MCNC: 167 MG/DL (ref 70–99)
GLUCOSE BLD-MCNC: 177 MG/DL (ref 70–99)
GLUCOSE BLD-MCNC: 75 MG/DL (ref 70–99)
GLUCOSE BLD-MCNC: 80 MG/DL (ref 70–99)
HCT VFR BLD CALC: 36 % (ref 36–48)
HEMOGLOBIN: 11.9 G/DL (ref 12–16)
MCH RBC QN AUTO: 31.1 PG (ref 26–34)
MCHC RBC AUTO-ENTMCNC: 33.1 G/DL (ref 31–36)
MCV RBC AUTO: 93.7 FL (ref 80–100)
PDW BLD-RTO: 14.1 % (ref 12.4–15.4)
PERFORMED ON: ABNORMAL
PERFORMED ON: NORMAL
PLATELET # BLD: 71 K/UL (ref 135–450)
PLATELET SLIDE REVIEW: ABNORMAL
PMV BLD AUTO: 9.1 FL (ref 5–10.5)
POTASSIUM REFLEX MAGNESIUM: 5.4 MMOL/L (ref 3.5–5.1)
RBC # BLD: 3.84 M/UL (ref 4–5.2)
REASON FOR REJECTION: NORMAL
REJECTED TEST: NORMAL
SLIDE REVIEW: ABNORMAL
SODIUM BLD-SCNC: 136 MMOL/L (ref 136–145)
TOTAL PROTEIN: 6.8 G/DL (ref 6.4–8.2)
WBC # BLD: 7.7 K/UL (ref 4–11)

## 2018-10-02 PROCEDURE — 36415 COLL VENOUS BLD VENIPUNCTURE: CPT

## 2018-10-02 PROCEDURE — 90686 IIV4 VACC NO PRSV 0.5 ML IM: CPT | Performed by: PHYSICAL MEDICINE & REHABILITATION

## 2018-10-02 PROCEDURE — 97116 GAIT TRAINING THERAPY: CPT

## 2018-10-02 PROCEDURE — 80053 COMPREHEN METABOLIC PANEL: CPT

## 2018-10-02 PROCEDURE — 97167 OT EVAL HIGH COMPLEX 60 MIN: CPT

## 2018-10-02 PROCEDURE — 97530 THERAPEUTIC ACTIVITIES: CPT

## 2018-10-02 PROCEDURE — 97110 THERAPEUTIC EXERCISES: CPT

## 2018-10-02 PROCEDURE — 97535 SELF CARE MNGMENT TRAINING: CPT

## 2018-10-02 PROCEDURE — 6360000002 HC RX W HCPCS: Performed by: PHYSICAL MEDICINE & REHABILITATION

## 2018-10-02 PROCEDURE — 85027 COMPLETE CBC AUTOMATED: CPT

## 2018-10-02 PROCEDURE — 6370000000 HC RX 637 (ALT 250 FOR IP): Performed by: PHYSICAL MEDICINE & REHABILITATION

## 2018-10-02 PROCEDURE — 1280000000 HC REHAB R&B

## 2018-10-02 PROCEDURE — 97162 PT EVAL MOD COMPLEX 30 MIN: CPT

## 2018-10-02 PROCEDURE — G0008 ADMIN INFLUENZA VIRUS VAC: HCPCS | Performed by: PHYSICAL MEDICINE & REHABILITATION

## 2018-10-02 RX ADMIN — NIFEDIPINE 90 MG: 30 TABLET, FILM COATED, EXTENDED RELEASE ORAL at 21:37

## 2018-10-02 RX ADMIN — INSULIN LISPRO 4 UNITS: 100 INJECTION, SOLUTION INTRAVENOUS; SUBCUTANEOUS at 12:08

## 2018-10-02 RX ADMIN — DOCUSATE SODIUM 100 MG: 100 CAPSULE, LIQUID FILLED ORAL at 21:37

## 2018-10-02 RX ADMIN — OXYCODONE HYDROCHLORIDE 10 MG: 5 TABLET ORAL at 04:44

## 2018-10-02 RX ADMIN — CLONIDINE HYDROCHLORIDE 0.2 MG: 0.1 TABLET ORAL at 07:45

## 2018-10-02 RX ADMIN — ONDANSETRON 8 MG: 4 TABLET, ORALLY DISINTEGRATING ORAL at 10:32

## 2018-10-02 RX ADMIN — INFLUENZA A VIRUS A/MICHIGAN/45/2015 X-275 (H1N1) ANTIGEN (FORMALDEHYDE INACTIVATED), INFLUENZA A VIRUS A/SINGAPORE/INFIMH-16-0019/2016 IVR-186 (H3N2) ANTIGEN (FORMALDEHYDE INACTIVATED), INFLUENZA B VIRUS B/PHUKET/3073/2013 ANTIGEN (FORMALDEHYDE INACTIVATED), AND INFLUENZA B VIRUS B/MARYLAND/15/2016 BX-69A ANTIGEN (FORMALDEHYDE INACTIVATED) 0.5 ML: 15; 15; 15; 15 INJECTION, SUSPENSION INTRAMUSCULAR at 07:46

## 2018-10-02 RX ADMIN — PANTOPRAZOLE SODIUM 40 MG: 40 TABLET, DELAYED RELEASE ORAL at 07:45

## 2018-10-02 RX ADMIN — NIFEDIPINE 90 MG: 30 TABLET, FILM COATED, EXTENDED RELEASE ORAL at 07:45

## 2018-10-02 RX ADMIN — METOPROLOL TARTRATE 100 MG: 50 TABLET ORAL at 07:45

## 2018-10-02 RX ADMIN — INSULIN LISPRO 4 UNITS: 100 INJECTION, SOLUTION INTRAVENOUS; SUBCUTANEOUS at 17:31

## 2018-10-02 RX ADMIN — CLONIDINE HYDROCHLORIDE 0.2 MG: 0.1 TABLET ORAL at 21:34

## 2018-10-02 RX ADMIN — DOCUSATE SODIUM 100 MG: 100 CAPSULE, LIQUID FILLED ORAL at 07:46

## 2018-10-02 RX ADMIN — CALCIUM ACETATE 2001 MG: 667 CAPSULE ORAL at 07:45

## 2018-10-02 RX ADMIN — INSULIN GLARGINE 20 UNITS: 100 INJECTION, SOLUTION SUBCUTANEOUS at 21:39

## 2018-10-02 RX ADMIN — CINACALCET HYDROCHLORIDE 30 MG: 30 TABLET, COATED ORAL at 07:52

## 2018-10-02 RX ADMIN — INSULIN LISPRO 1 UNITS: 100 INJECTION, SOLUTION INTRAVENOUS; SUBCUTANEOUS at 12:07

## 2018-10-02 RX ADMIN — OXYCODONE HYDROCHLORIDE 10 MG: 5 TABLET ORAL at 21:34

## 2018-10-02 RX ADMIN — CALCIUM ACETATE 2001 MG: 667 CAPSULE ORAL at 12:05

## 2018-10-02 RX ADMIN — METOPROLOL TARTRATE 100 MG: 50 TABLET ORAL at 21:37

## 2018-10-02 RX ADMIN — CLONIDINE HYDROCHLORIDE 0.2 MG: 0.1 TABLET ORAL at 12:05

## 2018-10-02 RX ADMIN — LEVOTHYROXINE SODIUM 25 MCG: 25 TABLET ORAL at 04:45

## 2018-10-02 RX ADMIN — CALCIUM ACETATE 2001 MG: 667 CAPSULE ORAL at 16:51

## 2018-10-02 RX ADMIN — OXYCODONE HYDROCHLORIDE 10 MG: 5 TABLET ORAL at 00:37

## 2018-10-02 RX ADMIN — ONDANSETRON 8 MG: 4 TABLET, ORALLY DISINTEGRATING ORAL at 00:39

## 2018-10-02 RX ADMIN — TRAZODONE HYDROCHLORIDE 50 MG: 50 TABLET ORAL at 21:37

## 2018-10-02 RX ADMIN — DOXAZOSIN 4 MG: 2 TABLET ORAL at 21:37

## 2018-10-02 RX ADMIN — OXYCODONE HYDROCHLORIDE 10 MG: 5 TABLET ORAL at 16:51

## 2018-10-02 RX ADMIN — INSULIN LISPRO 1 UNITS: 100 INJECTION, SOLUTION INTRAVENOUS; SUBCUTANEOUS at 17:30

## 2018-10-02 ASSESSMENT — PAIN SCALES - GENERAL
PAINLEVEL_OUTOF10: 8
PAINLEVEL_OUTOF10: 8
PAINLEVEL_OUTOF10: 5
PAINLEVEL_OUTOF10: 5
PAINLEVEL_OUTOF10: 8
PAINLEVEL_OUTOF10: 0
PAINLEVEL_OUTOF10: 4
PAINLEVEL_OUTOF10: 7
PAINLEVEL_OUTOF10: 0
PAINLEVEL_OUTOF10: 8
PAINLEVEL_OUTOF10: 5
PAINLEVEL_OUTOF10: 5

## 2018-10-02 ASSESSMENT — PAIN DESCRIPTION - ORIENTATION
ORIENTATION: RIGHT;LEFT
ORIENTATION: LEFT;RIGHT
ORIENTATION: RIGHT;LEFT
ORIENTATION: RIGHT;LEFT

## 2018-10-02 ASSESSMENT — PAIN DESCRIPTION - PAIN TYPE
TYPE: CHRONIC PAIN
TYPE: ACUTE PAIN
TYPE: CHRONIC PAIN

## 2018-10-02 ASSESSMENT — PAIN DESCRIPTION - LOCATION
LOCATION: LEG;FOOT
LOCATION: FOOT;HAND;LEG

## 2018-10-02 NOTE — PROGRESS NOTES
lunch, and talking to dietician, pleasant, agreeable to OT evaluation. Pain Assessment  Patient Currently in Pain: Yes  Pain Assessment: 0-10  Pain Level: 4  Pain Type: Chronic pain  Pain Location: Foot, Hand, Leg  Pain Orientation: Left, Right  Pain Intervention(s): Repositioned, Emotional support     Social/Functional History  Social/Functional History  Lives With: Spouse (14 old daughter)  Type of Home: House  Home Layout: Multi-level, Bed/Bath upstairs (quad level )  Home Access: Stairs to enter with rails (5 steps between each level with L hand rail)  Entrance Stairs - Number of Steps: 5  Entrance Stairs - Rails: Left  Bathroom Shower/Tub: Walk-in shower  Bathroom Toilet: Standard  Bathroom Equipment: Shower chair (does not use shower chair)  Home Equipment: 4 wheeled walker, Cane, Standard walker, Rolling walker, Wheelchair-manual (states does not use AD in house, uses 4WW to HD)  ADL Assistance: Independent  Homemaking Assistance: Needs assistance (spouse completes laundry; pt and spouse share cooking; pt unable to complete deep cleaning anymore-- interested in house cleaning services)  Homemaking Responsibilities: Yes  Meal Prep Responsibility: Primary  Laundry Responsibility: No  Cleaning Responsibility: Secondary  Bill Paying/Finance Responsibility: Secondary  Shopping Responsibility: Primary  Ambulation Assistance: Independent  Transfer Assistance: Independent  Active : No  Patient's  Info: spouse  Occupation: On disability (\"I haven't worked in years\")  Leisure & Hobbies: watching movies, shopping  Additional Comments: Pt reports she could d/c to father in [address] [place] [address] (6 doors down from her home) where she could stay on 1 level. Objective   Vision: Impaired  Vision Exceptions: Cataracts; Wears glasses for reading  Hearing: Within functional limits    Orientation  Overall Orientation Status: Within Functional Limits  Observation/Palpation  Posture: Good  Balance  Sitting Balance:

## 2018-10-02 NOTE — PLAN OF CARE
Problem: Nutrition  Goal: Optimal nutrition therapy  Outcome: Ongoing  Nutrition Problem: Inadequate oral intake  Intervention: Food and/or Nutrient Delivery: Continue current diet  Nutritional Goals: Pt will have intakes 50% or greater during ARU stay

## 2018-10-02 NOTE — H&P
aureus) culture positive 16    nares NEGATIVE    PONV (postoperative nausea and vomiting)     Stress fracture of ankle     left    Thyroid disease     hypothyroidism    UGI bleed        Past Surgical History:   Procedure Laterality Date    ABDOMEN SURGERY      peritoneal cath    BRAIN BIOPSY      encephaolpathy due to immunosuppresive meds    CATARACT REMOVAL Left      SECTION      COLONOSCOPY      COLONOSCOPY  2016    no specimen    DIALYSIS FISTULA CREATION Left     upper arm    ENDOSCOPY, COLON, DIAGNOSTIC      EYE SURGERY Bilateral     CARINI, Dr Samia Sun, retinopathy    FRACTURE SURGERY Left 2016    IM nailing left tibial fibula fracture    KIDNEY TRANSPLANT  2002    at time of pancreas tx    KIDNEY TRANSPLANT Left     PANCREAS SURGERY  2002    TRANSPLANT    UPPER GASTROINTESTINAL ENDOSCOPY  2016    biopsy-esophagitis       Family History   Problem Relation Age of Onset    Adopted:  Yes    Cancer Mother         breast    Heart Disease Father        Social History     Social History    Marital status:      Spouse name: N/A    Number of children: N/A    Years of education: N/A     Social History Main Topics    Smoking status: Never Smoker    Smokeless tobacco: Never Used    Alcohol use Yes      Comment: 2 X per year    Drug use: No    Sexual activity: Yes     Other Topics Concern    Not on file     Social History Narrative    No narrative on file         Current Facility-Administered Medications   Medication Dose Route Frequency Provider Last Rate Last Dose    dextrose 50 % solution 12.5 g  12.5 g Intravenous PRN Samantha Hernandez MD        dextrose 5 % solution  100 mL/hr Intravenous PRN Samantha Hernandez MD        glucagon (rDNA) injection 1 mg  1 mg Intramuscular PRN Samantha Hernandez MD        glucose (GLUTOSE) 40 % oral gel 15 g  15 g Oral PRN Samantha Hernandez MD        insulin lispro (HUMALOG) injection vial 0-6 Units  0-6 Units speech, nutritional services, wound care, and prosthetics and orthotics. Given the patients complex condition  and risk of further medical complications, rehabilitation services cannot be  safely provided at a lower level of care such as a skilled nursing facility. I have compared the patients medical and functional status at the time of the  preadmission screening and the same on this date, and there are no significant changes. By signing this document, I acknowledge that I have personally performed a  full physical examination on this patient within 24 hours of admission to  this inpatient rehabilitation facility and have determined the patient to be  able to tolerate the above course of treatment at an intensive level for a  reasonable period of time. I will be completing a detailed individualized  Plan of Care for this patient by day four of the patients stay based upon the  Preadmission Screen, this Post-Admission Evaluation, and the therapy  evaluations. Barriers: decreased endurance, weakness, decreased balance, cognition, medical comorbidities  Services Required: PT, OT, SLP  Goals: Rose for mobility, ADLs, cognition  Prognosis: Good  Anticipated Dispo: home with spouse  ELOS: 7-10 days    Rehabilitation Diagnosis:   16.0, Debility (non-cardiac, non-pulmonary      Assessment and Plan:    Impairments: decreased endurance, weakness, decreased balance, cognition    Debility - PT/OT to address endurance, strength, compensatory strategies, equipment    Metabolic Encephalopathy - Due to DKA. Now resolving. Regulate sleep/wake. Emphasis on routine. SLP. Uncontrolled DM1 - DKA resolved. Lant, kp, ISS.  Education and counseling to improve compliance.      HTN - nifedipine, metoprolol, doxazosin, clonidine     ESRD on HD MWF - nephrology consulted     Chronic troponin elevation - likely related to HTN and ESRD.  No chest pain, EKG not suggestive of ischemia.  No further workup.     Hypothyroidism -

## 2018-10-02 NOTE — PROGRESS NOTES
Physical Therapy  Facility/Department: [facility]  Initial Assessment    NAME: Shakila Nichols  : 1970  MRN: [id number]    Date of Service: 10/2/2018    Discharge Recommendations:  Home with assist PRN, Home with Home health PT   PT Equipment Recommendations  Equipment Needed: No  Other: pt has RW, cane, and 4WW at home    Patient Diagnosis(es): There were no encounter diagnoses. 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; Arthritis; Asthma; Blood transfusion; Coma ([facility] Diabetes mellitus ([facility] [facility] DKA (diabetic ketoacidoses) ([facility] [facility] End-stage renal disease (ESRD) ([facility] [facility] ESRD on dialysis ([facility] [address] [facility] GERD (gastroesophageal reflux disease); Heart murmur; Hemodialysis patient ([facility] [facility] History of blood transfusion; Hypercalcemia; Hypertension; IBS (irritable bowel syndrome); MRSA (methicillin resistant staph aureus) culture positive; MRSA (methicillin resistant staph aureus) culture positive; PONV (postoperative nausea and vomiting); Stress fracture of ankle; Thyroid disease; and UGI bleed. has a past surgical history that includes Pancreas surgery (); Brain Biopsy (); Dialysis fistula creation (Left); Kidney transplant ();  section; Kidney transplant (Left, ); eye surgery (Bilateral); Cataract removal (Left); Abdomen surgery; Endoscopy, colon, diagnostic; fracture surgery (Left, 2016); Upper gastrointestinal endoscopy (2016); Colonoscopy; and Colonoscopy (2016).     Restrictions  Restrictions/Precautions  Restrictions/Precautions: General Precautions, Fall Risk  Position Activity Restriction  Other position/activity restrictions: up as tolerated     Vision/Hearing  Vision: Impaired  Vision Exceptions: Wears glasses for reading;Cataracts  Hearing: Within functional limits       Subjective  General  Chart Reviewed: Yes  Patient assessed for rehabilitation services?: Yes  Additional Pertinent Hx: HTN, DMII, ESRD on HD, DKA, asthma,

## 2018-10-02 NOTE — PLAN OF CARE
Problem: Pain:  Goal: Control of acute pain  Control of acute pain   Outcome: Ongoing  . Patient able to use pain rating scale 0/10 adequately without problems. Pain medications explained along with frequency. Pt reports pain 8/10 before her pain meds are due. Call light within reach. Will continue to monitor and treat pain.

## 2018-10-03 LAB
ALBUMIN SERPL-MCNC: 3.6 G/DL (ref 3.4–5)
ANION GAP SERPL CALCULATED.3IONS-SCNC: 11 MMOL/L (ref 3–16)
BUN BLDV-MCNC: 37 MG/DL (ref 7–20)
CALCIUM SERPL-MCNC: 9.9 MG/DL (ref 8.3–10.6)
CHLORIDE BLD-SCNC: 91 MMOL/L (ref 99–110)
CO2: 30 MMOL/L (ref 21–32)
CREAT SERPL-MCNC: 4.3 MG/DL (ref 0.6–1.1)
GFR AFRICAN AMERICAN: 13
GFR NON-AFRICAN AMERICAN: 11
GLUCOSE BLD-MCNC: 120 MG/DL (ref 70–99)
GLUCOSE BLD-MCNC: 261 MG/DL (ref 70–99)
GLUCOSE BLD-MCNC: 67 MG/DL (ref 70–99)
GLUCOSE BLD-MCNC: 77 MG/DL (ref 70–99)
HCT VFR BLD CALC: 32.1 % (ref 36–48)
HEMOGLOBIN: 10.7 G/DL (ref 12–16)
MCH RBC QN AUTO: 30.9 PG (ref 26–34)
MCHC RBC AUTO-ENTMCNC: 33.2 G/DL (ref 31–36)
MCV RBC AUTO: 93.3 FL (ref 80–100)
PDW BLD-RTO: 14.1 % (ref 12.4–15.4)
PERFORMED ON: ABNORMAL
PERFORMED ON: ABNORMAL
PERFORMED ON: NORMAL
PHOSPHORUS: 1.4 MG/DL (ref 2.5–4.9)
PLATELET # BLD: 91 K/UL (ref 135–450)
PMV BLD AUTO: 9.1 FL (ref 5–10.5)
POTASSIUM SERPL-SCNC: 4.8 MMOL/L (ref 3.5–5.1)
RBC # BLD: 3.44 M/UL (ref 4–5.2)
SODIUM BLD-SCNC: 132 MMOL/L (ref 136–145)
WBC # BLD: 5.2 K/UL (ref 4–11)

## 2018-10-03 PROCEDURE — 97530 THERAPEUTIC ACTIVITIES: CPT

## 2018-10-03 PROCEDURE — 6370000000 HC RX 637 (ALT 250 FOR IP): Performed by: PHYSICAL MEDICINE & REHABILITATION

## 2018-10-03 PROCEDURE — 85027 COMPLETE CBC AUTOMATED: CPT

## 2018-10-03 PROCEDURE — 90937 HEMODIALYSIS REPEATED EVAL: CPT

## 2018-10-03 PROCEDURE — 97110 THERAPEUTIC EXERCISES: CPT

## 2018-10-03 PROCEDURE — 97535 SELF CARE MNGMENT TRAINING: CPT

## 2018-10-03 PROCEDURE — 6360000002 HC RX W HCPCS: Performed by: PHYSICAL MEDICINE & REHABILITATION

## 2018-10-03 PROCEDURE — 80069 RENAL FUNCTION PANEL: CPT

## 2018-10-03 PROCEDURE — 97116 GAIT TRAINING THERAPY: CPT

## 2018-10-03 PROCEDURE — 1280000000 HC REHAB R&B

## 2018-10-03 PROCEDURE — 6370000000 HC RX 637 (ALT 250 FOR IP): Performed by: INTERNAL MEDICINE

## 2018-10-03 RX ORDER — HYDRALAZINE HYDROCHLORIDE 25 MG/1
25 TABLET, FILM COATED ORAL EVERY 8 HOURS SCHEDULED
Status: DISCONTINUED | OUTPATIENT
Start: 2018-10-03 | End: 2018-10-04

## 2018-10-03 RX ORDER — INSULIN GLARGINE 100 [IU]/ML
15 INJECTION, SOLUTION SUBCUTANEOUS NIGHTLY
Status: DISCONTINUED | OUTPATIENT
Start: 2018-10-03 | End: 2018-10-11 | Stop reason: HOSPADM

## 2018-10-03 RX ADMIN — OXYCODONE HYDROCHLORIDE 10 MG: 5 TABLET ORAL at 21:34

## 2018-10-03 RX ADMIN — CALCIUM ACETATE 2001 MG: 667 CAPSULE ORAL at 08:07

## 2018-10-03 RX ADMIN — ONDANSETRON 8 MG: 4 TABLET, ORALLY DISINTEGRATING ORAL at 08:53

## 2018-10-03 RX ADMIN — CINACALCET HYDROCHLORIDE 30 MG: 30 TABLET, COATED ORAL at 08:08

## 2018-10-03 RX ADMIN — NIFEDIPINE 90 MG: 30 TABLET, FILM COATED, EXTENDED RELEASE ORAL at 08:08

## 2018-10-03 RX ADMIN — NIFEDIPINE 90 MG: 30 TABLET, FILM COATED, EXTENDED RELEASE ORAL at 21:34

## 2018-10-03 RX ADMIN — HYDRALAZINE HYDROCHLORIDE 25 MG: 25 TABLET, FILM COATED ORAL at 21:33

## 2018-10-03 RX ADMIN — METOPROLOL TARTRATE 100 MG: 50 TABLET ORAL at 21:34

## 2018-10-03 RX ADMIN — CLONIDINE HYDROCHLORIDE 0.2 MG: 0.1 TABLET ORAL at 21:34

## 2018-10-03 RX ADMIN — TRAZODONE HYDROCHLORIDE 50 MG: 50 TABLET ORAL at 21:35

## 2018-10-03 RX ADMIN — INSULIN LISPRO 2 UNITS: 100 INJECTION, SOLUTION INTRAVENOUS; SUBCUTANEOUS at 21:46

## 2018-10-03 RX ADMIN — CALCIUM ACETATE 2001 MG: 667 CAPSULE ORAL at 12:01

## 2018-10-03 RX ADMIN — METOPROLOL TARTRATE 100 MG: 50 TABLET ORAL at 08:07

## 2018-10-03 RX ADMIN — INSULIN LISPRO 4 UNITS: 100 INJECTION, SOLUTION INTRAVENOUS; SUBCUTANEOUS at 08:11

## 2018-10-03 RX ADMIN — DOCUSATE SODIUM 100 MG: 100 CAPSULE, LIQUID FILLED ORAL at 21:34

## 2018-10-03 RX ADMIN — DOCUSATE SODIUM 100 MG: 100 CAPSULE, LIQUID FILLED ORAL at 08:08

## 2018-10-03 RX ADMIN — LEVOTHYROXINE SODIUM 25 MCG: 25 TABLET ORAL at 06:56

## 2018-10-03 RX ADMIN — INSULIN GLARGINE 15 UNITS: 100 INJECTION, SOLUTION SUBCUTANEOUS at 21:45

## 2018-10-03 RX ADMIN — CLONIDINE HYDROCHLORIDE 0.2 MG: 0.1 TABLET ORAL at 08:07

## 2018-10-03 RX ADMIN — PANTOPRAZOLE SODIUM 40 MG: 40 TABLET, DELAYED RELEASE ORAL at 08:08

## 2018-10-03 RX ADMIN — DOXAZOSIN 4 MG: 2 TABLET ORAL at 21:34

## 2018-10-03 ASSESSMENT — PAIN SCALES - GENERAL: PAINLEVEL_OUTOF10: 8

## 2018-10-03 NOTE — PROGRESS NOTES
Physical Therapy  Facility/Department: [facility]  Daily Treatment Note  NAME: Ruchi Allred  : 1970  MRN: [id number]    Date of Service: 10/3/2018    Discharge Recommendations:  Home with assist PRN, Home with Home health PT   PT Equipment Recommendations  Equipment Needed: No  Other: pt has RW, cane, and 4WW at home    Patient Diagnosis(es): There were no encounter diagnoses. 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; Arthritis; Asthma; Blood transfusion; Coma ([facility] [address] Diabetes mellitus ([facility] [address] [facility] DKA (diabetic ketoacidoses) ([facility] [address] End-stage renal disease (ESRD) ([facility] [address] ESRD on dialysis ([facility] [address] GERD (gastroesophageal reflux disease); Heart murmur; Hemodialysis patient ([facility] [address] History of blood transfusion; Hypercalcemia; Hypertension; IBS (irritable bowel syndrome); MRSA (methicillin resistant staph aureus) culture positive; MRSA (methicillin resistant staph aureus) culture positive; PONV (postoperative nausea and vomiting); Stress fracture of ankle; Thyroid disease; and UGI bleed. has a past surgical history that includes Pancreas surgery (); Brain Biopsy (); Dialysis fistula creation (Left); Kidney transplant ();  section; Kidney transplant (Left, ); eye surgery (Bilateral); Cataract removal (Left); Abdomen surgery; Endoscopy, colon, diagnostic; fracture surgery (Left, 2016); Upper gastrointestinal endoscopy (2016); Colonoscopy; and Colonoscopy (2016). Restrictions  Restrictions/Precautions  Restrictions/Precautions: General Precautions, Fall Risk  Position Activity Restriction  Other position/activity restrictions: up as tolerated     Subjective   General  Chart Reviewed: Yes  Additional Pertinent Hx: HTN, DMII, ESRD on HD, DKA, asthma, GERD  Response To Previous Treatment: Patient with no complaints from previous session.   Family / Caregiver Present: No  Referring Practitioner: Sena Grace access home and community environments  Long term goal 4: Pt will ascend/descend 1 flight of steps with supervision using unilateral rail in order to access multi-level home  Long term goal 5: Pt will complete car transfer with supervision in order to access dialysis and physician appointments at d/c  Patient Goals   Patient goals : \"to get up my steps\", \"to be safer\"    Plan    Plan  Times per week: 5 out of 7 days/week  Plan weeks: 10 days  Current Treatment Recommendations: Strengthening, Balance Training, Functional Mobility Training, Transfer Training, Gait Training, Stair training, Wheelchair Mobility Training, Endurance Training, Home Exercise Program, Safety Education & Training, Patient/Caregiver Education & Training, Equipment Evaluation, Education, & procurement  Safety Devices  Type of devices:  All fall risk precautions in place, Call light within reach, Chair alarm in place, Left in chair, Nurse notified     Therapy Time   Individual Concurrent Group Co-treatment   Time In 0900         Time Out 1000         Minutes 60         Timed Code Treatment Minutes: [place] Time:   Individual Concurrent Group Co-treatment   Time In 1400         Time Out 1440         Minutes 40         Timed Code Treatment Minutes:  40    Total Treatment Minutes:  [address], PT, DPT #[id number]

## 2018-10-03 NOTE — PATIENT CARE CONFERENCE
[facility]  Inpatient Rehabilitation  Weekly Team Conference Note    Date:   Patient Name: Angela Long        MRN: [id number]    : 1970  (50 y.o.)  Gender: female   Referring Practitioner: Hussein Bell MD  Diagnosis: debility      Interventions to be utilized toward barriers to discharge, per discipline:  [address] observed barriers to dc: Pain, Decreased endurance, Decreased sensation, Impaired vision, Medical complications, constipation and nausea, wound care. Nursing interventions: Decrease nausea with antiemetics, stool softners and wound care dressings.    Family Education:   Fall Risk:  Yes      Physical therapy observed barriers to dc:    Baseline: lives in quad level home, independent without AD in home, used cane or 4WW in community   Current level: supervision-SBA for transfers, walks ~200ft with SBA using RW, 4 steps with SBA using B rails   Barriers to DC: mild unsteadiness   Needs in order to achieve dc home/next level of care: needs to be supervision-mod I for all mobility; able to stay at father in laws home (5 [address] and can stay on main level) for 24 hr supervision if needed      Physical therapy interventions:   Current Treatment Recommendations: Strengthening, Balance Training, Functional Mobility Training, Transfer Training, Gait Training, Stair training, Wheelchair Mobility Training, Endurance Training, Home Exercise Program, Safety Education & Training, Patient/Caregiver Education & Training, Equipment Evaluation, Education, & procurement      PHYSICAL THERAPY  FIMs last conference: N/A    FIMS current conference:  [employer], Chair, Wheel Chair: 5 - Requires setup/supervision/cues  Walk: 5 - Supervision Requires standby supervision or cuing to walk at least 150 feet  Distance Walked: 218ft  Wheel Chair: 5 - [address] standby supervision or cuing to operate wheelchair at least 150 feet  Distance Traveled in [address]: 195ft  Stairs: 2- Transfer: 4 - Requires steadying assistance only < 25% assist  Shower Transfer: 4 - Minimal contact assistance, pt. expends 75% or more effort      Assessment: Pt agreeable to OT session. Pt performed standing tasks with SBA/supervision and good safety in kitchen. Pt required min VCs due to low vision but states that she knows where everything is in her kitchen. Pt with good standing tolerance with ability to stand for nearly 30 minutes. Continue POC. Speech therapy observed barriers to dc:    Baseline:    Current level:   Barriers to DC:   Needs in order to achieve dc home/next level of care:    Speech Therapy interventions:  Dysphagia:    Speech/Language/Cognition:        SPEECH THERAPY (intentionally left blank if not actively being seen by this service):       NUTRITION  Weight: 136 lb 11 oz (62 kg) / Body mass index is 23.46 kg/m². Diet Order: Dietary Nutrition Supplements: Diabetic Oral Supplement  DIET RENAL;  PO Meals Eaten (%): 1 - 25% - variable intakes per EMR  Education: Declined education on initial encounter. Pt reports that she is scheduled to meet with an endocrinologist and RD after d/c for her diabetes. Pt states that she also has a RD available where she gets dialysis. Pt favorable to reviewing carb counting and renal diet information at next visit. CASE MANAGEMENT  Assessment: SW is following for recommendations. She in an established HD patient.          Interdisciplinary Goals:   1.) Pt will perform toileting with mod I.  2.) Pt will ambulate in room and hallway with mod I using RW  3.)  4.)  5.)    Discharge Plan   Estimated discharge date: 10-  Destination: home health  Pass:No  Services at Discharge: [facility], Occupational Therapy and Nursing Other pending eval  Equipment at Discharge: none    Team Members Present at Conference:  : Raymond Escobedo MSW, LSW    Occupational Therapist: Kody Mccabe OT  Physical Therapist: Sarah Matta

## 2018-10-03 NOTE — PROGRESS NOTES
Telephone call to Dr Octavia Sauceda to notify of podiatry consult. Patient notified of same.  Juan Daniel Solomon

## 2018-10-04 LAB
GLUCOSE BLD-MCNC: 154 MG/DL (ref 70–99)
GLUCOSE BLD-MCNC: 162 MG/DL (ref 70–99)
GLUCOSE BLD-MCNC: 175 MG/DL (ref 70–99)
GLUCOSE BLD-MCNC: 226 MG/DL (ref 70–99)
GLUCOSE BLD-MCNC: 52 MG/DL (ref 70–99)
GLUCOSE BLD-MCNC: 80 MG/DL (ref 70–99)
PERFORMED ON: ABNORMAL
PERFORMED ON: NORMAL

## 2018-10-04 PROCEDURE — 6370000000 HC RX 637 (ALT 250 FOR IP): Performed by: INTERNAL MEDICINE

## 2018-10-04 PROCEDURE — 97110 THERAPEUTIC EXERCISES: CPT

## 2018-10-04 PROCEDURE — 97530 THERAPEUTIC ACTIVITIES: CPT

## 2018-10-04 PROCEDURE — 6370000000 HC RX 637 (ALT 250 FOR IP): Performed by: PHYSICAL MEDICINE & REHABILITATION

## 2018-10-04 PROCEDURE — 6360000002 HC RX W HCPCS: Performed by: PHYSICAL MEDICINE & REHABILITATION

## 2018-10-04 PROCEDURE — 1280000000 HC REHAB R&B

## 2018-10-04 PROCEDURE — 6370000000 HC RX 637 (ALT 250 FOR IP): Performed by: PODIATRIST

## 2018-10-04 PROCEDURE — 97116 GAIT TRAINING THERAPY: CPT

## 2018-10-04 RX ORDER — UREA 10 %
3 LOTION (ML) TOPICAL NIGHTLY PRN
Status: DISCONTINUED | OUTPATIENT
Start: 2018-10-04 | End: 2018-10-04 | Stop reason: CLARIF

## 2018-10-04 RX ORDER — PROMETHAZINE HYDROCHLORIDE 25 MG/1
12.5 TABLET ORAL EVERY 6 HOURS PRN
Status: DISCONTINUED | OUTPATIENT
Start: 2018-10-04 | End: 2018-10-04

## 2018-10-04 RX ORDER — HYDRALAZINE HYDROCHLORIDE 25 MG/1
50 TABLET, FILM COATED ORAL EVERY 8 HOURS SCHEDULED
Status: DISCONTINUED | OUTPATIENT
Start: 2018-10-04 | End: 2018-10-09

## 2018-10-04 RX ORDER — TRAZODONE HYDROCHLORIDE 50 MG/1
100 TABLET ORAL NIGHTLY PRN
Status: DISCONTINUED | OUTPATIENT
Start: 2018-10-04 | End: 2018-10-11 | Stop reason: HOSPADM

## 2018-10-04 RX ORDER — PROMETHAZINE HYDROCHLORIDE 25 MG/1
25 TABLET ORAL EVERY 6 HOURS PRN
Status: DISCONTINUED | OUTPATIENT
Start: 2018-10-04 | End: 2018-10-11 | Stop reason: HOSPADM

## 2018-10-04 RX ADMIN — MAGNESIUM HYDROXIDE 30 ML: 400 SUSPENSION ORAL at 16:44

## 2018-10-04 RX ADMIN — OXYCODONE HYDROCHLORIDE 10 MG: 5 TABLET ORAL at 07:23

## 2018-10-04 RX ADMIN — INSULIN GLARGINE 15 UNITS: 100 INJECTION, SOLUTION SUBCUTANEOUS at 20:45

## 2018-10-04 RX ADMIN — NIFEDIPINE 90 MG: 30 TABLET, FILM COATED, EXTENDED RELEASE ORAL at 07:47

## 2018-10-04 RX ADMIN — INSULIN LISPRO 4 UNITS: 100 INJECTION, SOLUTION INTRAVENOUS; SUBCUTANEOUS at 07:43

## 2018-10-04 RX ADMIN — LEVOTHYROXINE SODIUM 25 MCG: 25 TABLET ORAL at 05:28

## 2018-10-04 RX ADMIN — HYDRALAZINE HYDROCHLORIDE 25 MG: 25 TABLET, FILM COATED ORAL at 05:27

## 2018-10-04 RX ADMIN — HYDRALAZINE HYDROCHLORIDE 50 MG: 25 TABLET, FILM COATED ORAL at 15:02

## 2018-10-04 RX ADMIN — OXYCODONE HYDROCHLORIDE 10 MG: 5 TABLET ORAL at 02:01

## 2018-10-04 RX ADMIN — PROMETHAZINE HYDROCHLORIDE 12.5 MG: 25 TABLET ORAL at 11:02

## 2018-10-04 RX ADMIN — METOPROLOL TARTRATE 100 MG: 50 TABLET ORAL at 07:43

## 2018-10-04 RX ADMIN — DOCUSATE SODIUM 100 MG: 100 CAPSULE, LIQUID FILLED ORAL at 07:46

## 2018-10-04 RX ADMIN — CLONIDINE HYDROCHLORIDE 0.2 MG: 0.1 TABLET ORAL at 15:03

## 2018-10-04 RX ADMIN — CINACALCET HYDROCHLORIDE 30 MG: 30 TABLET, COATED ORAL at 07:52

## 2018-10-04 RX ADMIN — PROMETHAZINE HYDROCHLORIDE 25 MG: 25 TABLET ORAL at 16:42

## 2018-10-04 RX ADMIN — PANTOPRAZOLE SODIUM 40 MG: 40 TABLET, DELAYED RELEASE ORAL at 05:27

## 2018-10-04 RX ADMIN — MUPIROCIN: 20 OINTMENT TOPICAL at 20:38

## 2018-10-04 RX ADMIN — DOXAZOSIN 4 MG: 2 TABLET ORAL at 20:37

## 2018-10-04 RX ADMIN — OXYCODONE HYDROCHLORIDE 10 MG: 5 TABLET ORAL at 13:11

## 2018-10-04 RX ADMIN — DOCUSATE SODIUM 100 MG: 100 CAPSULE, LIQUID FILLED ORAL at 20:38

## 2018-10-04 RX ADMIN — CALCIUM ACETATE 2001 MG: 667 CAPSULE ORAL at 07:43

## 2018-10-04 RX ADMIN — METOPROLOL TARTRATE 100 MG: 50 TABLET ORAL at 20:37

## 2018-10-04 RX ADMIN — NIFEDIPINE 90 MG: 30 TABLET, FILM COATED, EXTENDED RELEASE ORAL at 20:37

## 2018-10-04 RX ADMIN — CLONIDINE HYDROCHLORIDE 0.2 MG: 0.1 TABLET ORAL at 07:47

## 2018-10-04 RX ADMIN — TRAZODONE HYDROCHLORIDE 100 MG: 50 TABLET ORAL at 21:45

## 2018-10-04 RX ADMIN — ONDANSETRON 8 MG: 4 TABLET, ORALLY DISINTEGRATING ORAL at 05:27

## 2018-10-04 RX ADMIN — OXYCODONE HYDROCHLORIDE 10 MG: 5 TABLET ORAL at 07:43

## 2018-10-04 RX ADMIN — INSULIN LISPRO 2 UNITS: 100 INJECTION, SOLUTION INTRAVENOUS; SUBCUTANEOUS at 17:15

## 2018-10-04 RX ADMIN — HYDRALAZINE HYDROCHLORIDE 50 MG: 25 TABLET, FILM COATED ORAL at 21:45

## 2018-10-04 RX ADMIN — CLONIDINE HYDROCHLORIDE 0.2 MG: 0.1 TABLET ORAL at 20:38

## 2018-10-04 RX ADMIN — INSULIN LISPRO 1 UNITS: 100 INJECTION, SOLUTION INTRAVENOUS; SUBCUTANEOUS at 20:45

## 2018-10-04 ASSESSMENT — PAIN SCALES - GENERAL
PAINLEVEL_OUTOF10: 3
PAINLEVEL_OUTOF10: 10
PAINLEVEL_OUTOF10: 7
PAINLEVEL_OUTOF10: 10
PAINLEVEL_OUTOF10: 8
PAINLEVEL_OUTOF10: 10
PAINLEVEL_OUTOF10: 5
PAINLEVEL_OUTOF10: 0
PAINLEVEL_OUTOF10: 5

## 2018-10-04 ASSESSMENT — PAIN DESCRIPTION - ORIENTATION
ORIENTATION: RIGHT

## 2018-10-04 ASSESSMENT — PAIN DESCRIPTION - LOCATION
LOCATION: FOOT
LOCATION: FOOT
LOCATION: FOOT;GENERALIZED

## 2018-10-04 ASSESSMENT — PAIN DESCRIPTION - PAIN TYPE
TYPE: CHRONIC PAIN

## 2018-10-04 NOTE — CONSULTS
Image   10/4/2018  4:12 PM   Wound Type Wound 10/4/2018  4:12 PM   Wound Diabetic 10/4/2018  4:12 PM   Dressing Status Dry 10/3/2018  8:00 PM   Dressing/Treatment Open to air 10/4/2018  4:12 PM   Wound Cleansed Not Cleansed 10/4/2018  4:12 PM   Wound Length (cm) 1 cm 10/4/2018  4:12 PM   Wound Width (cm) 0.5 cm 10/4/2018  4:12 PM   Wound Depth (cm)  0 10/4/2018  4:12 PM   Calculated Wound Size (cm^2) (l*w) 0.5 cm^2 10/4/2018  4:12 PM   Tunneling Position ___ O'Clock 0 10/4/2018  4:12 PM   Undermining Starts ___ O'Clock 0 10/4/2018  4:12 PM   Undermining Ends___ O'Clock 0 10/4/2018  4:12 PM   Undermining Maxium Distance (cm) 0 10/4/2018  4:12 PM   Wound Assessment Brown 10/4/2018  4:12 PM   Drainage Amount None 10/4/2018  4:12 PM   Odor None 10/4/2018  4:12 PM   Margins Attached edges; Defined edges 10/4/2018  4:12 PM   Other%Wound Bed brown scab 100% 10/4/2018  4:12 PM   Culture Taken No 10/4/2018  4:12 PM   Number of days: 2     Left plantar foot:          Response to treatment:  Well tolerated by patient. Pain Assessment:  Severity:  0 / 10  Quality of pain: N/A  Wound Pain Timing/Severity: none  Premedicated: No    Plan   Plan of Care: Wound 10/01/18 Other (Comment) Heel Left dark area with surrounding dry tissue, no redness no drainage-Dressing/Treatment: Open to air     Dead dry skin trimmed around scabbed dry ulcer. Feet dry and flaky. No treatment needed for this wound. Moisturize feet daily after bathing. Wound care to sign off. Podiatry consulted to trim toe nails.     Specialty Bed Required : N/A   [] Low Air Loss   [] Pressure Redistribution  [] Fluid Immersion  [] Bariatric  [] Total Pressure Relief  [] Other:     Current Diet: Dietary Nutrition Supplements: Diabetic Oral Supplement  Dietician consult:  N/A    Discharge Plan:  Placement for patient upon discharge: unknown at this time  Patient [facility] for [facility]: Yes, follow up with podiatry     Referrals:  [] Social

## 2018-10-04 NOTE — PROGRESS NOTES
Second call placed to podiatry office for consult.  Office staff stated that physician was on his way to do hospital rounds

## 2018-10-04 NOTE — PROGRESS NOTES
Called to rehab gym, pt with complaints of feeling like her blood sugar was low. Checked blood sugar with results of 52. Orange juice, areli crackers and peanut butter given. Rechecked in 15 minutes and results were 80.  Notified Dr Gagan Edwards, new orders given

## 2018-10-04 NOTE — CONSULTS
Intravenous, Q MWF  levothyroxine (SYNTHROID) tablet 25 mcg, 25 mcg, Oral, Daily  loperamide (IMODIUM) capsule 2 mg, 2 mg, Oral, 4x Daily PRN  metoprolol tartrate (LOPRESSOR) tablet 100 mg, 100 mg, Oral, BID  NIFEdipine (ADALAT CC) extended release tablet 90 mg, 90 mg, Oral, BID  oxyCODONE (ROXICODONE) immediate release tablet 5 mg, 5 mg, Oral, Q4H PRN **OR** oxyCODONE (ROXICODONE) immediate release tablet 10 mg, 10 mg, Oral, Q4H PRN  pantoprazole (PROTONIX) tablet 40 mg, 40 mg, Oral, QAM AC  Allergies:  Lisinopril; Adhesive tape; Metoclopramide; Neurontin [gabapentin]; Tramadol; Codeine; and Reglan [metoclopramide hcl]    Social History:   TOBACCO:  Never used tobacco  ETOH:  Never drank alcohol  DRUGS:  Never used recreational drugs  Family History:   Family History   Problem Relation Age of Onset    Adopted: Yes    Cancer Mother         breast    Heart Disease Father      REVIEW OF SYSTEMS:    {REVIEW OF [facility]:[id number]}    PHYSICAL EXAM:    VITALS:  BP (!) 160/80   Pulse 69   Temp 98.1 °F (36.7 °C) (Oral)   Resp 16   Ht 5' 4\" (1.626 m)   Wt 127 lb 9.6 oz (57.9 kg)   LMP 03/12/2008   SpO2 98%   BMI 21.90 kg/m²   AAO x3, NAD female    Lower extremity exam reveals: Vascular: Dorsalis pedis and posterior tibial pulses are palpable. CFT less than 3 seconds to each digits. Neuro: Decreased  Protective sensation is noted to bilateral feet. Derm: Nails 1-5th bilaterally are thick yellow and crumbly with subungal debris noted. T  MS: 5/5 MS for all compartments. Full thickness to SQ tissue ulcer is noted left plantar arch which measures 0.5x 0.5x 0.1cm -with dried fibrotic like eschar noted overlying. No signs of infection. Ulcer does not track, undermine or probe to bone. IMPRESSION/RECOMMENDATIONS:  This is 50year old female with:   1. Diabetes type 1, uncontrolled   2.  Vigil grade 1 ulcer left arch foot - Sharp excisional debridement of  Left  Plantar foot ulcer fullthickness of skin to SQ tissue was completed to promote healing and remove any non viable tissue. Recommend mupirocin ointment and dsd to wound  Daily. 3. Onychomycosis 1-5th b/l with pain -Debrided nails 1-5th b/l in length and thickness to prevent pain and infection.    4. ESRD on hemodialysis

## 2018-10-04 NOTE — PROGRESS NOTES
Physical Therapy  Facility/Department: [facility]  Daily Treatment Note  NAME: Suzan Stevenson  : 1970  MRN: [id number]    Date of Service: 10/4/2018    Discharge Recommendations:  Home with assist PRN, Home with Home health PT   PT Equipment Recommendations  Equipment Needed: No  Other: pt has RW, cane, and 4WW at home    Patient Diagnosis(es): There were no encounter diagnoses. 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; Arthritis; Asthma; Blood transfusion; Coma ([facility] [address] Diabetes mellitus ([facility] [address] DKA (diabetic ketoacidoses) ([facility] [address] End-stage renal disease (ESRD) ([facility] [address] ESRD on dialysis ([facility] [address] GERD (gastroesophageal reflux disease); Heart murmur; Hemodialysis patient ([facility] [address] History of blood transfusion; Hypercalcemia; Hypertension; IBS (irritable bowel syndrome); MRSA (methicillin resistant staph aureus) culture positive; MRSA (methicillin resistant staph aureus) culture positive; PONV (postoperative nausea and vomiting); Stress fracture of ankle; Thyroid disease; and UGI bleed. has a past surgical history that includes Pancreas surgery (); Brain Biopsy (); Dialysis fistula creation (Left); Kidney transplant ();  section; Kidney transplant (Left, ); eye surgery (Bilateral); Cataract removal (Left); Abdomen surgery; Endoscopy, colon, diagnostic; fracture surgery (Left, 2016); Upper gastrointestinal endoscopy (2016); Colonoscopy; and Colonoscopy (2016). Restrictions  Restrictions/Precautions  Restrictions/Precautions: General Precautions, Fall Risk  Position Activity Restriction  Other position/activity restrictions: up as tolerated  Subjective   General  Chart Reviewed: Yes  Additional Pertinent Hx: HTN, DMII, ESRD on HD, DKA, asthma, GERD  Response To Previous Treatment: Patient with no complaints from previous session.   Family / Caregiver Present: No  Referring Practitioner: Allison Zepeda MD  Subjective  Subjective: Pt resting in bed on approach. States 10/10 pain and very nauseous this morning. States received pain medicine and nausea medicine already  Pain Screening  Patient Currently in Pain: Yes  Pain Assessment  Pain Assessment: 0-10  Pain Level: 10  Pain Type: Chronic pain  Pain Location: Foot;Generalized;Back  Pain Intervention(s): Ambulation/Increased activity;Repositioned  Response to Pain Intervention: Patient Satisfied       Objective    Pt declining OOB mobility due to nausea and pain this morning. Exercises  Straight Leg Raise: x 15 BLE  Quad Sets: x 15 BLE  Heelslides: x 15 BLE  Gluteal Sets: x 15 BLE  Ankle Pumps: x 15 BLE       Assessment   Body structures, Functions, Activity limitations: Decreased functional mobility ; Decreased strength;Decreased endurance;Decreased balance  Assessment: Pt limited due to nausea and pain this morning. Pt only able to complete bed exercises this date. Pt requiring increased time to complete all exercises with min cues for technique and increased control. Pt declining to transfers OOB this morning due to nausea.   Treatment Diagnosis: impaired strength and functional mobility  Prognosis: Good  Patient Education: ther ex  REQUIRES PT FOLLOW UP: Yes  Activity Tolerance  Activity Tolerance: Patient limited by pain  Activity Tolerance: Limited by nausea      Goals  Short term goals  Time Frame for Short term goals: 5 days  Short term goal 1: Pt will transfer supine to/from sit with supervision  Short term goal 2: Pt will transfer sit to/from stand with SBA-- GOAL MET 10/3  Short term goal 3: Pt will ambulate 50ft with SBA using LRAD-- GOAL MET 10/3  Short term goal 4: Pt will ascend/descend 5 steps with SBA using unilateral rail-- GOAL MET 10/3  Long term goals  Time Frame for Long term goals : 10 days  Long term goal 1: Pt will transfer supine to/from sit independently, with HOB flat, no rails  Long term goal 2: Pt will transfer sit to/from stand

## 2018-10-04 NOTE — PROGRESS NOTES
Physical Therapy  Facility/Department: [facility]  Daily Treatment Note  NAME: Zuly Givens  : 1970  MRN: [id number]    Date of Service: 10/4/2018    Discharge Recommendations:  Home with assist PRN, Home with Home health PT   PT Equipment Recommendations  Equipment Needed: No  Other: pt has RW, cane, and 4WW at home    Patient Diagnosis(es): There were no encounter diagnoses. 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; Arthritis; Asthma; Blood transfusion; Coma ([facility] [address] Diabetes mellitus ([facility] [address] DKA (diabetic ketoacidoses) ([facility] [address] End-stage renal disease (ESRD) ([facility] [address] ESRD on dialysis ([facility] [address] GERD (gastroesophageal reflux disease); Heart murmur; Hemodialysis patient ([facility] [address] History of blood transfusion; Hypercalcemia; Hypertension; IBS (irritable bowel syndrome); MRSA (methicillin resistant staph aureus) culture positive; MRSA (methicillin resistant staph aureus) culture positive; PONV (postoperative nausea and vomiting); Stress fracture of ankle; Thyroid disease; and UGI bleed. has a past surgical history that includes Pancreas surgery (); Brain Biopsy (); Dialysis fistula creation (Left); Kidney transplant ();  section; Kidney transplant (Left, ); eye surgery (Bilateral); Cataract removal (Left); Abdomen surgery; Endoscopy, colon, diagnostic; fracture surgery (Left, 2016); Upper gastrointestinal endoscopy (2016); Colonoscopy; and Colonoscopy (2016). Restrictions  Restrictions/Precautions  Restrictions/Precautions: General Precautions, Fall Risk  Position Activity Restriction  Other position/activity restrictions: up as tolerated     Subjective   General  Chart Reviewed: Yes  Additional Pertinent Hx: HTN, DMII, ESRD on HD, DKA, asthma, GERD  Response To Previous Treatment: Patient with no complaints from previous session.   Family / Caregiver Present: No  Referring Practitioner: Frank Ryan structures, Functions, Activity limitations: Decreased functional mobility ; Decreased strength;Decreased endurance;Decreased balance  Assessment: Pt chinoo's improved activity tolerance with mobility this date compared to previous session. Reports improved nausea compard to moring session although continues to report R foot pain. Able to complete all mobility with supervision-SBA with increased time. Pt remains motivated to participate and increase independence.   Treatment Diagnosis: impaired strength and functional mobility  Prognosis: Good  Patient Education: ther ex, transfers, gait  REQUIRES PT FOLLOW UP: Yes  Activity Tolerance  Activity Tolerance: Patient Tolerated treatment well;Patient limited by pain       Goals  Short term goals  Time Frame for Short term goals: 5 days  Short term goal 1: Pt will transfer supine to/from sit with supervision-- GOAL MET 10/4  Short term goal 2: Pt will transfer sit to/from stand with SBA-- GOAL MET 10/3  Short term goal 3: Pt will ambulate 50ft with SBA using LRAD-- GOAL MET 10/3  Short term goal 4: Pt will ascend/descend 5 steps with SBA using unilateral rail-- GOAL MET 10/3  Long term goals  Time Frame for Long term goals : 10 days  Long term goal 1: Pt will transfer supine to/from sit independently, with HOB flat, no rails  Long term goal 2: Pt will transfer sit to/from stand with mod I up to [address] [facility]. term goal 3: Pt will ambulate 150ft with mod I using LRAD in order to access home and community environments  Long term goal 4: Pt will ascend/descend 1 flight of steps with supervision using unilateral rail in order to access multi-level home  Long term goal 5: Pt will complete car transfer with supervision in order to access dialysis and physician appointments at d/c  Patient Goals   Patient goals : \"to get up my steps\", \"to be safer\"    Plan    Plan  Times per week: 5 out of 7 days/week  Plan weeks: 10 days  Current Treatment Recommendations: Strengthening, Balance

## 2018-10-04 NOTE — PROGRESS NOTES
workup.     Hypothyroidism - levothyroxine.     PUD/GERD - Pantoprazole     IBS - home eluxadoline if patient would like to have family bring in     Safety - fall precautions    Sleep - Trazodone, melatonin     FULL CODE    Interdisciplinary team conference was held today with entire rehab treatment team including PT, OT, SLP, Dietician, RN, and SW. Discussion focused on progress toward rehab goals and discharge planning. Making progress with endurance and strength. Working on safety awareness and balance. Total treatment time >35 min with greater than 50% spent in care coordination. Anticipated Dispo: home with   Services:  PT, OT, RN  DME: none  ELOS: 10/11      Morales Thibodeaux MD 10/4/2018, 10:02 AM

## 2018-10-05 LAB
GLUCOSE BLD-MCNC: 107 MG/DL (ref 70–99)
GLUCOSE BLD-MCNC: 121 MG/DL (ref 70–99)
GLUCOSE BLD-MCNC: 139 MG/DL (ref 70–99)
PERFORMED ON: ABNORMAL

## 2018-10-05 PROCEDURE — 6370000000 HC RX 637 (ALT 250 FOR IP): Performed by: INTERNAL MEDICINE

## 2018-10-05 PROCEDURE — 97530 THERAPEUTIC ACTIVITIES: CPT

## 2018-10-05 PROCEDURE — 97535 SELF CARE MNGMENT TRAINING: CPT

## 2018-10-05 PROCEDURE — 6370000000 HC RX 637 (ALT 250 FOR IP): Performed by: PHYSICAL MEDICINE & REHABILITATION

## 2018-10-05 PROCEDURE — 97116 GAIT TRAINING THERAPY: CPT

## 2018-10-05 PROCEDURE — 97110 THERAPEUTIC EXERCISES: CPT

## 2018-10-05 PROCEDURE — 1280000000 HC REHAB R&B

## 2018-10-05 PROCEDURE — 6360000002 HC RX W HCPCS: Performed by: PHYSICAL MEDICINE & REHABILITATION

## 2018-10-05 PROCEDURE — 90937 HEMODIALYSIS REPEATED EVAL: CPT

## 2018-10-05 RX ADMIN — PANTOPRAZOLE SODIUM 40 MG: 40 TABLET, DELAYED RELEASE ORAL at 07:07

## 2018-10-05 RX ADMIN — DOCUSATE SODIUM 100 MG: 100 CAPSULE, LIQUID FILLED ORAL at 20:45

## 2018-10-05 RX ADMIN — NIFEDIPINE 90 MG: 30 TABLET, FILM COATED, EXTENDED RELEASE ORAL at 20:44

## 2018-10-05 RX ADMIN — DOCUSATE SODIUM 100 MG: 100 CAPSULE, LIQUID FILLED ORAL at 08:01

## 2018-10-05 RX ADMIN — INSULIN GLARGINE 15 UNITS: 100 INJECTION, SOLUTION SUBCUTANEOUS at 20:46

## 2018-10-05 RX ADMIN — CLONIDINE HYDROCHLORIDE 0.2 MG: 0.1 TABLET ORAL at 08:01

## 2018-10-05 RX ADMIN — NIFEDIPINE 90 MG: 30 TABLET, FILM COATED, EXTENDED RELEASE ORAL at 08:00

## 2018-10-05 RX ADMIN — METOPROLOL TARTRATE 100 MG: 50 TABLET ORAL at 08:01

## 2018-10-05 RX ADMIN — DOXAZOSIN 4 MG: 2 TABLET ORAL at 20:45

## 2018-10-05 RX ADMIN — ACETAMINOPHEN 650 MG: 325 TABLET ORAL at 20:44

## 2018-10-05 RX ADMIN — CINACALCET HYDROCHLORIDE 30 MG: 30 TABLET, COATED ORAL at 08:07

## 2018-10-05 RX ADMIN — HYDRALAZINE HYDROCHLORIDE 50 MG: 25 TABLET, FILM COATED ORAL at 20:44

## 2018-10-05 RX ADMIN — DOXERCALCIFEROL 2 MCG: 2 INJECTION, SOLUTION INTRAVENOUS at 19:01

## 2018-10-05 RX ADMIN — MUPIROCIN: 20 OINTMENT TOPICAL at 08:12

## 2018-10-05 RX ADMIN — HYDRALAZINE HYDROCHLORIDE 50 MG: 25 TABLET, FILM COATED ORAL at 05:33

## 2018-10-05 RX ADMIN — DARBEPOETIN ALFA 25 MCG: 25 INJECTION, SOLUTION INTRAVENOUS; SUBCUTANEOUS at 18:56

## 2018-10-05 RX ADMIN — PROMETHAZINE HYDROCHLORIDE 25 MG: 25 TABLET ORAL at 18:26

## 2018-10-05 RX ADMIN — MAGNESIUM HYDROXIDE 30 ML: 400 SUSPENSION ORAL at 20:44

## 2018-10-05 RX ADMIN — OXYCODONE HYDROCHLORIDE 10 MG: 5 TABLET ORAL at 18:26

## 2018-10-05 RX ADMIN — Medication 3 MG: at 22:41

## 2018-10-05 RX ADMIN — CLONIDINE HYDROCHLORIDE 0.2 MG: 0.1 TABLET ORAL at 13:30

## 2018-10-05 RX ADMIN — CLONIDINE HYDROCHLORIDE 0.2 MG: 0.1 TABLET ORAL at 20:45

## 2018-10-05 RX ADMIN — PROMETHAZINE HYDROCHLORIDE 25 MG: 25 TABLET ORAL at 09:12

## 2018-10-05 RX ADMIN — METOPROLOL TARTRATE 100 MG: 50 TABLET ORAL at 20:44

## 2018-10-05 RX ADMIN — LEVOTHYROXINE SODIUM 25 MCG: 25 TABLET ORAL at 05:33

## 2018-10-05 RX ADMIN — HYDRALAZINE HYDROCHLORIDE 50 MG: 25 TABLET, FILM COATED ORAL at 13:31

## 2018-10-05 RX ADMIN — OXYCODONE HYDROCHLORIDE 10 MG: 5 TABLET ORAL at 01:42

## 2018-10-05 ASSESSMENT — PAIN SCALES - GENERAL
PAINLEVEL_OUTOF10: 0
PAINLEVEL_OUTOF10: 0
PAINLEVEL_OUTOF10: 4
PAINLEVEL_OUTOF10: 8
PAINLEVEL_OUTOF10: 0
PAINLEVEL_OUTOF10: 8
PAINLEVEL_OUTOF10: 0

## 2018-10-05 ASSESSMENT — PAIN DESCRIPTION - ORIENTATION: ORIENTATION: RIGHT

## 2018-10-05 ASSESSMENT — PAIN DESCRIPTION - DESCRIPTORS: DESCRIPTORS: ACHING

## 2018-10-05 ASSESSMENT — PAIN DESCRIPTION - PAIN TYPE: TYPE: CHRONIC PAIN

## 2018-10-05 ASSESSMENT — PAIN DESCRIPTION - LOCATION: LOCATION: GENERALIZED;BACK

## 2018-10-05 NOTE — PROGRESS NOTES
Nephrology Progress Note   [email or web address]      This patient is a 52 year old female whom we are following for ESRD. Subjective:  Last had HD on 10/3 with 4 liters removed, post-weight of 57.5 kg. Intake reduced    ROS:  No sob, fevers. Vitals:  BP (!) 160/81   Pulse 67   Temp 97.8 °F (36.6 °C) (Oral)   Resp 16   Ht 5' 4\" (1.626 m)   Wt 127 lb 9.6 oz (57.9 kg)   LMP 03/12/2008   SpO2 95%   BMI 21.90 kg/m²    I/O last 3 completed shifts: In: 450 [P.O.:450]  Out: -   I/O this shift: In: 80 [P.O.:90]  Out: -     Physical Exam:  Physical Exam   Constitutional: She is oriented to person, place, and time. She appears well-developed. She is cooperative. No distress. HENT:   Head: Normocephalic and atraumatic. Nose: Nose normal.   Eyes: EOM are normal. No scleral icterus. Neck: Neck supple. No JVD present. No tracheal deviation present. No thyromegaly present. Cardiovascular: Normal rate and regular rhythm. Exam reveals no gallop and no friction rub. Pulmonary/Chest: Effort normal and breath sounds normal. No respiratory distress. She has no wheezes. She has no rales. Abdominal: Soft. Bowel sounds are normal. She exhibits no distension. There is no tenderness. Musculoskeletal: She exhibits no edema. Neurological: She is alert and oriented to person, place, and time. Skin: Skin is warm. Psychiatric: She has a normal mood and affect. Vitals reviewed.     Access: JETHRO NERI      Medications:   hydrALAZINE  50 mg Oral 3 times per day    phosphorus  250 mg Oral Once    mupirocin   Topical Daily    insulin glargine  15 Units Subcutaneous Nightly    insulin lispro  0-6 Units Subcutaneous TID WC    insulin lispro  0-3 Units Subcutaneous Nightly    docusate sodium  100 mg Oral BID    cinacalcet  30 mg Oral Daily    cloNIDine  0.2 mg Oral TID    darbepoetin fredy-polysorbate  25 mcg Subcutaneous Weekly    doxazosin  4 mg Oral Nightly    doxercalciferol  2 mcg Intravenous Q MWF   

## 2018-10-05 NOTE — CARE COORDINATION
SW met with the patient to go over home health care. She is unsure if it is [facility] which is noted in previous case management notes. She stated she will call her outpatient HD location Monday as there is someone there that will know the home health care. Will follow up Monday.     Claudette Santana MSW, LSW

## 2018-10-05 NOTE — PROGRESS NOTES
training:   Up/down 4 steps with SBA using B rails, via non-reciprocal pattern. Good safety awareness, no LOB. Seated rest break required after completion d/t fatigue. Standing dynamic balance:   Bean bag toss onto target-- supervision for standing balance without UE support. Pt completes x 2 sets with stand tolerance ~3 mins each set. Seated rest break between sets d/t mild fatigue with standing  Bed mobility:              Sit to supine with supervision with HOB flat  *Pt supine in bed at end of session with needs in reach and bed alarm on. RN notified. PCA present. Assessment   Body structures, Functions, Activity limitations: Decreased functional mobility ; Decreased strength;Decreased endurance;Decreased balance  Assessment: Pt continues to demo improved activity tolerance and improved independence with mobility. Mildly limited by nausea this date although is motivated to continue to participate and increase independence. Able to complete all mobility with supervision-SBA with increased time. Will continue to progress functional mobility as appropriate.   Treatment Diagnosis: impaired strength and functional mobility  Prognosis: Good  Patient Education: ther ex, transfers, gait, stairs, bed mobility  REQUIRES PT FOLLOW UP: Yes  Activity Tolerance  Activity Tolerance: Patient Tolerated treatment well       Goals  Short term goals  Time Frame for Short term goals: 5 days  Short term goal 1: Pt will transfer supine to/from sit with supervision-- GOAL MET 10/4  Short term goal 2: Pt will transfer sit to/from stand with SBA-- GOAL MET 10/3  Short term goal 3: Pt will ambulate 50ft with SBA using LRAD-- GOAL MET 10/3  Short term goal 4: Pt will ascend/descend 5 steps with SBA using unilateral rail-- GOAL MET 10/3  Long term goals  Time Frame for Long term goals : 10 days  Long term goal 1: Pt will transfer supine to/from sit independently, with HOB flat, no rails  Long term goal 2: Pt will transfer sit to/from

## 2018-10-06 LAB
GLUCOSE BLD-MCNC: 137 MG/DL (ref 70–99)
GLUCOSE BLD-MCNC: 180 MG/DL (ref 70–99)
GLUCOSE BLD-MCNC: 248 MG/DL (ref 70–99)
GLUCOSE BLD-MCNC: 254 MG/DL (ref 70–99)
PERFORMED ON: ABNORMAL

## 2018-10-06 PROCEDURE — 97112 NEUROMUSCULAR REEDUCATION: CPT

## 2018-10-06 PROCEDURE — 97530 THERAPEUTIC ACTIVITIES: CPT

## 2018-10-06 PROCEDURE — 6370000000 HC RX 637 (ALT 250 FOR IP): Performed by: PHYSICAL MEDICINE & REHABILITATION

## 2018-10-06 PROCEDURE — 97110 THERAPEUTIC EXERCISES: CPT

## 2018-10-06 PROCEDURE — 1280000000 HC REHAB R&B

## 2018-10-06 PROCEDURE — 6370000000 HC RX 637 (ALT 250 FOR IP): Performed by: INTERNAL MEDICINE

## 2018-10-06 PROCEDURE — 97535 SELF CARE MNGMENT TRAINING: CPT

## 2018-10-06 PROCEDURE — 97116 GAIT TRAINING THERAPY: CPT

## 2018-10-06 RX ORDER — DIPHENHYDRAMINE HCL 25 MG
25 TABLET ORAL EVERY 6 HOURS PRN
Status: DISCONTINUED | OUTPATIENT
Start: 2018-10-06 | End: 2018-10-11 | Stop reason: HOSPADM

## 2018-10-06 RX ADMIN — DOXAZOSIN 4 MG: 2 TABLET ORAL at 21:19

## 2018-10-06 RX ADMIN — CINACALCET HYDROCHLORIDE 30 MG: 30 TABLET, COATED ORAL at 08:18

## 2018-10-06 RX ADMIN — INSULIN LISPRO 2 UNITS: 100 INJECTION, SOLUTION INTRAVENOUS; SUBCUTANEOUS at 21:19

## 2018-10-06 RX ADMIN — MAGNESIUM HYDROXIDE 30 ML: 400 SUSPENSION ORAL at 23:31

## 2018-10-06 RX ADMIN — HYDRALAZINE HYDROCHLORIDE 50 MG: 25 TABLET, FILM COATED ORAL at 21:19

## 2018-10-06 RX ADMIN — OXYCODONE HYDROCHLORIDE 10 MG: 5 TABLET ORAL at 08:18

## 2018-10-06 RX ADMIN — LEVOTHYROXINE SODIUM 25 MCG: 25 TABLET ORAL at 06:02

## 2018-10-06 RX ADMIN — METOPROLOL TARTRATE 100 MG: 50 TABLET ORAL at 08:18

## 2018-10-06 RX ADMIN — OXYCODONE HYDROCHLORIDE 10 MG: 5 TABLET ORAL at 21:18

## 2018-10-06 RX ADMIN — INSULIN LISPRO 2 UNITS: 100 INJECTION, SOLUTION INTRAVENOUS; SUBCUTANEOUS at 17:13

## 2018-10-06 RX ADMIN — CLONIDINE HYDROCHLORIDE 0.2 MG: 0.1 TABLET ORAL at 14:10

## 2018-10-06 RX ADMIN — PANTOPRAZOLE SODIUM 40 MG: 40 TABLET, DELAYED RELEASE ORAL at 06:02

## 2018-10-06 RX ADMIN — METOPROLOL TARTRATE 100 MG: 50 TABLET ORAL at 21:18

## 2018-10-06 RX ADMIN — TRAZODONE HYDROCHLORIDE 100 MG: 50 TABLET ORAL at 21:19

## 2018-10-06 RX ADMIN — DIPHENHYDRAMINE HCL 25 MG: 25 TABLET ORAL at 17:12

## 2018-10-06 RX ADMIN — HYDRALAZINE HYDROCHLORIDE 50 MG: 25 TABLET, FILM COATED ORAL at 14:10

## 2018-10-06 RX ADMIN — CLONIDINE HYDROCHLORIDE 0.2 MG: 0.1 TABLET ORAL at 08:18

## 2018-10-06 RX ADMIN — INSULIN GLARGINE 15 UNITS: 100 INJECTION, SOLUTION SUBCUTANEOUS at 23:29

## 2018-10-06 RX ADMIN — NIFEDIPINE 90 MG: 30 TABLET, FILM COATED, EXTENDED RELEASE ORAL at 08:18

## 2018-10-06 RX ADMIN — DOCUSATE SODIUM 100 MG: 100 CAPSULE, LIQUID FILLED ORAL at 21:18

## 2018-10-06 RX ADMIN — NIFEDIPINE 90 MG: 30 TABLET, FILM COATED, EXTENDED RELEASE ORAL at 21:19

## 2018-10-06 RX ADMIN — MUPIROCIN: 20 OINTMENT TOPICAL at 08:19

## 2018-10-06 RX ADMIN — CLONIDINE HYDROCHLORIDE 0.2 MG: 0.1 TABLET ORAL at 21:19

## 2018-10-06 RX ADMIN — DOCUSATE SODIUM 100 MG: 100 CAPSULE, LIQUID FILLED ORAL at 08:18

## 2018-10-06 RX ADMIN — HYDRALAZINE HYDROCHLORIDE 50 MG: 25 TABLET, FILM COATED ORAL at 06:02

## 2018-10-06 ASSESSMENT — PAIN DESCRIPTION - ORIENTATION: ORIENTATION: LEFT;RIGHT

## 2018-10-06 ASSESSMENT — PAIN SCALES - GENERAL
PAINLEVEL_OUTOF10: 5
PAINLEVEL_OUTOF10: 7
PAINLEVEL_OUTOF10: 0
PAINLEVEL_OUTOF10: 3

## 2018-10-06 ASSESSMENT — PAIN DESCRIPTION - PAIN TYPE: TYPE: CHRONIC PAIN

## 2018-10-06 ASSESSMENT — PAIN DESCRIPTION - LOCATION: LOCATION: FOOT;LEG

## 2018-10-06 NOTE — PROGRESS NOTES
Treatment time: 4hrs   Net UF: 4000 ml     Pre weight: 57.9 kg   Post weight:54.8 kg  EDW: 57.5 kg     Access used: LUE AVG    Access function:good with  ml/min     Medications or blood products given: Aranesp, Hectorol     Regular outpatient schedule: MWF     Summary of response to treatment: Pt tolerated 4hr tx well, removed net of UF 4L, VSS, access sites held x10min, no acute distress noted at this time, report given to floor RN and pt transported back to room    Copy of dialysis treatment record placed in chart, to be scanned into EMR. 10/05/18 1506 10/05/18 1933   Vital Signs   /70 (!) 145/77   Temp 97.7 °F (36.5 °C) 97.9 °F (36.6 °C)   Pulse 61 63   Weight 127 lb 10.3 oz (57.9 kg) 120 lb 13 oz (54.8 kg)   Weight Method Bedside scale Bed scale   Percent Weight Change 0.04 -5.35   Dry Weight 126 lb 12.2 oz (57.5 kg) 126 lb 12.2 oz (57.5 kg)   Pain Assessment   Pain Assessment 0-10 0-10   Pain Level 0 0   Post-Hemodialysis Assessment   Post-Treatment Procedures --  Blood returned; Access bleeding time < 10 minutes   Machine Disinfection Process --  Acid/Vinegar Clean;Heat Disinfect; Exterior Machine Disinfection   Rinseback Volume (ml) --  400 ml   Total Liters Processed (l/min) --  90.4 l/min   Dialyzer Clearance --  Clear   Duration of Treatment (minutes) --  240 minutes   Heparin amount administered during treatment (units) --  0 units   Hemodialysis Intake (ml) --  400 ml   Hemodialysis Output (ml) --  4400 ml   NET Removed (ml) --  4000 ml   Tolerated Treatment --  Good   Bilateral Breath Sounds Clear Clear   Edema Right lower extremity; Left lower extremity Generalized;Right lower extremity; Left lower extremity   RLE Edema +2;Pitting +2;Pitting   LLE Edema +2;Pitting +2;Pitting

## 2018-10-06 NOTE — PROGRESS NOTES
ascend/descend 1 flight of steps with supervision using unilateral rail in order to access multi-level home  Long term goal 5: Pt will complete car transfer with supervision in order to access dialysis and physician appointments at d/c  Patient Goals   Patient goals : \"to get up my steps\", \"to be safer\"    Plan    Plan  Times per week: 5 out of 7 days/week  Plan weeks: 10 days  Current Treatment Recommendations: Strengthening, Balance Training, Functional Mobility Training, Transfer Training, Gait Training, Stair training, Wheelchair Mobility Training, Endurance Training, Home Exercise Program, Safety Education & Training, Patient/Caregiver Education & Training, Equipment Evaluation, Education, & procurement  Safety Devices  Type of devices:  All fall risk precautions in place, Gait belt, Left in bed     Therapy Time   Individual Concurrent Group Co-treatment   Time In 1002         Time Out 1133         Minutes [address], [place], DPT

## 2018-10-06 NOTE — PROGRESS NOTES
Occupational Therapy  Facility/Department: [facility]  Daily Treatment Note  NAME: Tessie Rodrigues  : 1970  MRN: [id number]    Date of Service: 10/6/2018    Discharge Recommendations:  Home with assist PRN, Home with Home health OT, S Level 1       Patient Diagnosis(es): There were no encounter diagnoses. 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; Arthritis; Asthma; Blood transfusion; Coma ([facility] [address] Diabetes mellitus ([facility] [address] DKA (diabetic ketoacidoses) ([facility] [address] End-stage renal disease (ESRD) ([facility] [address] ESRD on dialysis ([facility] [address] GERD (gastroesophageal reflux disease); Heart murmur; Hemodialysis patient ([facility] [address] History of blood transfusion; Hypercalcemia; Hypertension; IBS (irritable bowel syndrome); MRSA (methicillin resistant staph aureus) culture positive; MRSA (methicillin resistant staph aureus) culture positive; PONV (postoperative nausea and vomiting); Stress fracture of ankle; Thyroid disease; and UGI bleed. has a past surgical history that includes Pancreas surgery (); Brain Biopsy (); Dialysis fistula creation (Left); Kidney transplant ();  section; Kidney transplant (Left, ); eye surgery (Bilateral); Cataract removal (Left); Abdomen surgery; Endoscopy, colon, diagnostic; fracture surgery (Left, 2016); Upper gastrointestinal endoscopy (2016); Colonoscopy; and Colonoscopy (2016).     Restrictions  Restrictions/Precautions  Restrictions/Precautions: General Precautions, Fall Risk  Required Braces or Orthoses?: No  Position Activity Restriction  Other position/activity restrictions: up as tolerated  Subjective   General  Chart Reviewed: Yes  Patient assessed for rehabilitation services?: Yes  Additional Pertinent Hx: DM I, HTN, DKA  Family / Caregiver Present: No  Referring Practitioner: Pastora Myrick MD  Diagnosis: debility  Subjective  Subjective: Pt in bed on arrival, pleasant and agreeable to treatment   General

## 2018-10-07 LAB
GLUCOSE BLD-MCNC: 108 MG/DL (ref 70–99)
GLUCOSE BLD-MCNC: 142 MG/DL (ref 70–99)
GLUCOSE BLD-MCNC: 227 MG/DL (ref 70–99)
GLUCOSE BLD-MCNC: 242 MG/DL (ref 70–99)
PERFORMED ON: ABNORMAL

## 2018-10-07 PROCEDURE — 1280000000 HC REHAB R&B

## 2018-10-07 PROCEDURE — 6370000000 HC RX 637 (ALT 250 FOR IP): Performed by: PHYSICAL MEDICINE & REHABILITATION

## 2018-10-07 PROCEDURE — 6370000000 HC RX 637 (ALT 250 FOR IP)

## 2018-10-07 PROCEDURE — 6370000000 HC RX 637 (ALT 250 FOR IP): Performed by: INTERNAL MEDICINE

## 2018-10-07 RX ORDER — OXYCODONE HYDROCHLORIDE AND ACETAMINOPHEN 5; 325 MG/1; MG/1
TABLET ORAL
Status: COMPLETED
Start: 2018-10-07 | End: 2018-10-07

## 2018-10-07 RX ADMIN — INSULIN LISPRO 2 UNITS: 100 INJECTION, SOLUTION INTRAVENOUS; SUBCUTANEOUS at 11:24

## 2018-10-07 RX ADMIN — INSULIN LISPRO 1 UNITS: 100 INJECTION, SOLUTION INTRAVENOUS; SUBCUTANEOUS at 17:33

## 2018-10-07 RX ADMIN — OXYCODONE HYDROCHLORIDE 10 MG: 5 TABLET ORAL at 05:32

## 2018-10-07 RX ADMIN — CLONIDINE HYDROCHLORIDE 0.2 MG: 0.1 TABLET ORAL at 08:58

## 2018-10-07 RX ADMIN — CLONIDINE HYDROCHLORIDE 0.2 MG: 0.1 TABLET ORAL at 21:40

## 2018-10-07 RX ADMIN — LEVOTHYROXINE SODIUM 25 MCG: 25 TABLET ORAL at 05:32

## 2018-10-07 RX ADMIN — CINACALCET HYDROCHLORIDE 30 MG: 30 TABLET, COATED ORAL at 08:58

## 2018-10-07 RX ADMIN — HYDRALAZINE HYDROCHLORIDE 50 MG: 25 TABLET, FILM COATED ORAL at 05:32

## 2018-10-07 RX ADMIN — TRAZODONE HYDROCHLORIDE 100 MG: 50 TABLET ORAL at 21:40

## 2018-10-07 RX ADMIN — MUPIROCIN: 20 OINTMENT TOPICAL at 08:59

## 2018-10-07 RX ADMIN — OXYCODONE HYDROCHLORIDE AND ACETAMINOPHEN 1 TABLET: 5; 325 TABLET ORAL at 21:41

## 2018-10-07 RX ADMIN — NIFEDIPINE 90 MG: 30 TABLET, FILM COATED, EXTENDED RELEASE ORAL at 08:58

## 2018-10-07 RX ADMIN — DIPHENHYDRAMINE HCL 25 MG: 25 TABLET ORAL at 21:40

## 2018-10-07 RX ADMIN — HYDRALAZINE HYDROCHLORIDE 50 MG: 25 TABLET, FILM COATED ORAL at 21:40

## 2018-10-07 RX ADMIN — METOPROLOL TARTRATE 100 MG: 50 TABLET ORAL at 21:40

## 2018-10-07 RX ADMIN — INSULIN GLARGINE 15 UNITS: 100 INJECTION, SOLUTION SUBCUTANEOUS at 21:39

## 2018-10-07 RX ADMIN — NIFEDIPINE 90 MG: 30 TABLET, FILM COATED, EXTENDED RELEASE ORAL at 21:40

## 2018-10-07 RX ADMIN — OXYCODONE HYDROCHLORIDE 10 MG: 5 TABLET ORAL at 11:23

## 2018-10-07 RX ADMIN — CLONIDINE HYDROCHLORIDE 0.2 MG: 0.1 TABLET ORAL at 15:12

## 2018-10-07 RX ADMIN — HYDRALAZINE HYDROCHLORIDE 50 MG: 25 TABLET, FILM COATED ORAL at 15:12

## 2018-10-07 RX ADMIN — DOCUSATE SODIUM 100 MG: 100 CAPSULE, LIQUID FILLED ORAL at 21:40

## 2018-10-07 RX ADMIN — DOXAZOSIN 4 MG: 2 TABLET ORAL at 21:41

## 2018-10-07 RX ADMIN — METOPROLOL TARTRATE 100 MG: 50 TABLET ORAL at 08:58

## 2018-10-07 RX ADMIN — PANTOPRAZOLE SODIUM 40 MG: 40 TABLET, DELAYED RELEASE ORAL at 05:32

## 2018-10-07 ASSESSMENT — PAIN SCALES - GENERAL
PAINLEVEL_OUTOF10: 8
PAINLEVEL_OUTOF10: 3
PAINLEVEL_OUTOF10: 7
PAINLEVEL_OUTOF10: 8

## 2018-10-07 ASSESSMENT — PAIN DESCRIPTION - ORIENTATION: ORIENTATION: LEFT;RIGHT

## 2018-10-07 ASSESSMENT — PAIN DESCRIPTION - DESCRIPTORS: DESCRIPTORS: ACHING

## 2018-10-07 ASSESSMENT — PAIN DESCRIPTION - LOCATION: LOCATION: LEG

## 2018-10-07 ASSESSMENT — PAIN DESCRIPTION - PAIN TYPE: TYPE: CHRONIC PAIN

## 2018-10-08 LAB
ALBUMIN SERPL-MCNC: 4.3 G/DL (ref 3.4–5)
ANION GAP SERPL CALCULATED.3IONS-SCNC: 13 MMOL/L (ref 3–16)
BUN BLDV-MCNC: 46 MG/DL (ref 7–20)
CALCIUM SERPL-MCNC: 10 MG/DL (ref 8.3–10.6)
CHLORIDE BLD-SCNC: 93 MMOL/L (ref 99–110)
CO2: 24 MMOL/L (ref 21–32)
CREAT SERPL-MCNC: 5 MG/DL (ref 0.6–1.1)
GFR AFRICAN AMERICAN: 11
GFR NON-AFRICAN AMERICAN: 9
GLUCOSE BLD-MCNC: 161 MG/DL (ref 70–99)
GLUCOSE BLD-MCNC: 193 MG/DL (ref 70–99)
GLUCOSE BLD-MCNC: 56 MG/DL (ref 70–99)
GLUCOSE BLD-MCNC: 63 MG/DL (ref 70–99)
GLUCOSE BLD-MCNC: 64 MG/DL (ref 70–99)
GLUCOSE BLD-MCNC: 77 MG/DL (ref 70–99)
GLUCOSE BLD-MCNC: 84 MG/DL (ref 70–99)
GLUCOSE BLD-MCNC: 94 MG/DL (ref 70–99)
HCT VFR BLD CALC: 35.2 % (ref 36–48)
HEMOGLOBIN: 11.5 G/DL (ref 12–16)
MCH RBC QN AUTO: 30.8 PG (ref 26–34)
MCHC RBC AUTO-ENTMCNC: 32.6 G/DL (ref 31–36)
MCV RBC AUTO: 94.3 FL (ref 80–100)
PDW BLD-RTO: 14.4 % (ref 12.4–15.4)
PERFORMED ON: ABNORMAL
PERFORMED ON: NORMAL
PHOSPHORUS: 2.3 MG/DL (ref 2.5–4.9)
PLATELET # BLD: 157 K/UL (ref 135–450)
PMV BLD AUTO: 8.5 FL (ref 5–10.5)
POTASSIUM SERPL-SCNC: 6.1 MMOL/L (ref 3.5–5.1)
RBC # BLD: 3.73 M/UL (ref 4–5.2)
SODIUM BLD-SCNC: 130 MMOL/L (ref 136–145)
WBC # BLD: 5.3 K/UL (ref 4–11)

## 2018-10-08 PROCEDURE — 80069 RENAL FUNCTION PANEL: CPT

## 2018-10-08 PROCEDURE — 6370000000 HC RX 637 (ALT 250 FOR IP): Performed by: INTERNAL MEDICINE

## 2018-10-08 PROCEDURE — 97110 THERAPEUTIC EXERCISES: CPT

## 2018-10-08 PROCEDURE — 85027 COMPLETE CBC AUTOMATED: CPT

## 2018-10-08 PROCEDURE — 6370000000 HC RX 637 (ALT 250 FOR IP): Performed by: PHYSICAL MEDICINE & REHABILITATION

## 2018-10-08 PROCEDURE — 90937 HEMODIALYSIS REPEATED EVAL: CPT

## 2018-10-08 PROCEDURE — 97530 THERAPEUTIC ACTIVITIES: CPT

## 2018-10-08 PROCEDURE — 1280000000 HC REHAB R&B

## 2018-10-08 PROCEDURE — 6360000002 HC RX W HCPCS: Performed by: PHYSICAL MEDICINE & REHABILITATION

## 2018-10-08 PROCEDURE — 36415 COLL VENOUS BLD VENIPUNCTURE: CPT

## 2018-10-08 PROCEDURE — 97535 SELF CARE MNGMENT TRAINING: CPT

## 2018-10-08 PROCEDURE — 97116 GAIT TRAINING THERAPY: CPT

## 2018-10-08 RX ADMIN — OXYCODONE HYDROCHLORIDE 10 MG: 5 TABLET ORAL at 08:49

## 2018-10-08 RX ADMIN — CINACALCET HYDROCHLORIDE 30 MG: 30 TABLET, COATED ORAL at 11:34

## 2018-10-08 RX ADMIN — LEVOTHYROXINE SODIUM 25 MCG: 25 TABLET ORAL at 05:57

## 2018-10-08 RX ADMIN — DOXERCALCIFEROL 2 MCG: 2 INJECTION, SOLUTION INTRAVENOUS at 18:10

## 2018-10-08 RX ADMIN — NIFEDIPINE 90 MG: 30 TABLET, FILM COATED, EXTENDED RELEASE ORAL at 21:21

## 2018-10-08 RX ADMIN — CLONIDINE HYDROCHLORIDE 0.2 MG: 0.1 TABLET ORAL at 21:20

## 2018-10-08 RX ADMIN — HYDRALAZINE HYDROCHLORIDE 50 MG: 25 TABLET, FILM COATED ORAL at 05:57

## 2018-10-08 RX ADMIN — METOPROLOL TARTRATE 100 MG: 50 TABLET ORAL at 21:21

## 2018-10-08 RX ADMIN — OXYCODONE HYDROCHLORIDE 5 MG: 5 TABLET ORAL at 19:46

## 2018-10-08 RX ADMIN — DOCUSATE SODIUM 100 MG: 100 CAPSULE, LIQUID FILLED ORAL at 08:49

## 2018-10-08 RX ADMIN — DOXAZOSIN 4 MG: 2 TABLET ORAL at 21:22

## 2018-10-08 RX ADMIN — INSULIN LISPRO 1 UNITS: 100 INJECTION, SOLUTION INTRAVENOUS; SUBCUTANEOUS at 22:57

## 2018-10-08 RX ADMIN — CLONIDINE HYDROCHLORIDE 0.2 MG: 0.1 TABLET ORAL at 08:48

## 2018-10-08 RX ADMIN — PANTOPRAZOLE SODIUM 40 MG: 40 TABLET, DELAYED RELEASE ORAL at 05:58

## 2018-10-08 RX ADMIN — HYDRALAZINE HYDROCHLORIDE 50 MG: 25 TABLET, FILM COATED ORAL at 21:21

## 2018-10-08 RX ADMIN — DOCUSATE SODIUM 100 MG: 100 CAPSULE, LIQUID FILLED ORAL at 21:21

## 2018-10-08 RX ADMIN — DIPHENHYDRAMINE HCL 25 MG: 25 TABLET ORAL at 13:43

## 2018-10-08 RX ADMIN — MUPIROCIN: 20 OINTMENT TOPICAL at 08:51

## 2018-10-08 RX ADMIN — NIFEDIPINE 90 MG: 30 TABLET, FILM COATED, EXTENDED RELEASE ORAL at 08:49

## 2018-10-08 RX ADMIN — METOPROLOL TARTRATE 100 MG: 50 TABLET ORAL at 08:49

## 2018-10-08 RX ADMIN — INSULIN GLARGINE 15 UNITS: 100 INJECTION, SOLUTION SUBCUTANEOUS at 21:22

## 2018-10-08 ASSESSMENT — PAIN DESCRIPTION - LOCATION
LOCATION: LEG

## 2018-10-08 ASSESSMENT — PAIN SCALES - GENERAL
PAINLEVEL_OUTOF10: 5
PAINLEVEL_OUTOF10: 3
PAINLEVEL_OUTOF10: 0
PAINLEVEL_OUTOF10: 3
PAINLEVEL_OUTOF10: 7
PAINLEVEL_OUTOF10: 8
PAINLEVEL_OUTOF10: 5
PAINLEVEL_OUTOF10: 5
PAINLEVEL_OUTOF10: 7

## 2018-10-08 ASSESSMENT — PAIN DESCRIPTION - PAIN TYPE
TYPE: CHRONIC PAIN

## 2018-10-08 ASSESSMENT — PAIN DESCRIPTION - ORIENTATION
ORIENTATION: LEFT;RIGHT
ORIENTATION: RIGHT;LEFT

## 2018-10-08 NOTE — PROGRESS NOTES
Occupational Therapy  Facility/Department: [facility]  Daily Treatment Note  NAME: Tammi Augustine  : 1970  MRN: [id number]    Date of Service: 10/8/2018    Discharge Recommendations:  Home with assist PRN, Home with Home health OT, S Level 1       Patient Diagnosis(es): There were no encounter diagnoses. 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; Arthritis; Asthma; Blood transfusion; Coma ([facility] [address] Diabetes mellitus ([facility] [address] DKA (diabetic ketoacidoses) ([facility] [address] End-stage renal disease (ESRD) ([facility] [address] ESRD on dialysis ([facility] [address] GERD (gastroesophageal reflux disease); Heart murmur; Hemodialysis patient ([facility] [address] History of blood transfusion; Hypercalcemia; Hypertension; IBS (irritable bowel syndrome); MRSA (methicillin resistant staph aureus) culture positive; MRSA (methicillin resistant staph aureus) culture positive; PONV (postoperative nausea and vomiting); Stress fracture of ankle; Thyroid disease; and UGI bleed. has a past surgical history that includes Pancreas surgery (); Brain Biopsy (); Dialysis fistula creation (Left); Kidney transplant ();  section; Kidney transplant (Left, ); eye surgery (Bilateral); Cataract removal (Left); Abdomen surgery; Endoscopy, colon, diagnostic; fracture surgery (Left, 2016); Upper gastrointestinal endoscopy (2016); Colonoscopy; and Colonoscopy (2016).     Restrictions  Restrictions/Precautions  Restrictions/Precautions: General Precautions, Fall Risk  Required Braces or Orthoses?: No  Position Activity Restriction  Other position/activity restrictions: up as tolerated  Subjective   General  Chart Reviewed: Yes  Patient assessed for rehabilitation services?: Yes  Additional Pertinent Hx: DM I, HTN, DKA  Family / Caregiver Present: No  Referring Practitioner: Shayna Veloz MD  Diagnosis: debility  Subjective  Subjective: Pt in bed, pleasant, states BG was low this morning, agreeable to OT UEs fatigued and not able to complete second set of 20 reps for zoomball. Balance  Sitting Balance: Independent  Standing Balance: Supervision (with RW)  Standing Balance  Time: 7 minutes x2  Activity: collection and placing of large beads on post  Sit to stand: Supervision  Stand to sit: Supervision  Comment:   Functional Mobility  Functional - Mobility Device: Rolling Walker  Activity: Retrieve items;Transport items  Assist Level: Supervision  Bed mobility  Supine to Sit: Supervision  Transfers  Stand Step Transfers: Supervision (with RW)  Sit to stand: Supervision  Stand to sit: Supervision        Coordination  Gross Motor: fair coordination for zoomball x20 and x15, pt became fatigued and unable to complete second set of 20, good coordination for grasping large beads to place on post, use of reacher for picking up from ground    Type of ROM/Therapeutic Exercise  Type of ROM/Therapeutic Exercise: Free weights  Comment: 3#  Exercises  Shoulder Flexion: x15  Shoulder Extension: x15  Horizontal ABduction: x15  Horizontal ADduction: x15  Elbow Flexion: x15  Elbow Extension: x15  Supination: x15  Pronation: x15  Wrist Flexion: x15  Wrist Extension: x15  Other: 2kg ball for a-p leaning, core rotation, and obliques 2x15 each                    Assessment   Performance deficits / Impairments: Decreased functional mobility ; Decreased ADL status; Decreased safe awareness;Decreased cognition;Decreased balance;Decreased endurance;Decreased strength  Assessment: Pt agreeable to OT session this date. Pt performed grooming at sink in stance with mod I. Pt completed dynamic standing task with good safety and balance and fair tolerance with ability to stand for 3-4 minutes before requiring rest break. Pt with good ability to step on/off balance discs. Continue POC.   Prognosis: Good  Patient Education: role of OT, transfer training, ADL retraining, standing balance/tolerance, safety  REQUIRES OT FOLLOW UP: Yes  Activity

## 2018-10-08 NOTE — PROGRESS NOTES
Nutrition Assessment    Type and Reason for Visit: Reassess    Nutrition Recommendations:   · Continue carb controlled, low potassium diet  · Continue Glucerna ONS  · May need more aggressive bowel regimen - last recorded BM 9/28  · Monitor po intakes, nutrition adequacy, weights, pertinent labs, BMs, education needs    Malnutrition Assessment:  · Malnutrition Status: At risk for malnutrition  · Context: Acute illness or injury  · Findings of the 6 clinical characteristics of malnutrition (Minimum of 2 out of 6 clinical characteristics is required to make the diagnosis of moderate or severe Protein Calorie Malnutrition based on AND/ASPEN Guidelines):  1. Energy Intake- (Greater than 50%),      2. Weight Loss-No significant weight loss,    3. Fat Loss-Mild subcutaneous fat loss, Orbital  4. Muscle Loss-No significant muscle mass loss,    5. Fluid Accumulation-No significant fluid accumulation,    6.  Strength-Not measured    Nutrition Diagnosis:   · Problem: Inadequate oral intake  · Etiology: related to Insufficient energy/nutrient consumption     Signs and symptoms:  as evidenced by Patient report of, Diet history of poor intake    Nutrition Assessment:  · Subjective Assessment: Follow up: Pt currently on a carb control, low potassium diet with Glucerna ONS. Intakes % per EMR. Pt reports that her appetite has been improving and she has been drinking her Glucerna. Provided pt with written and verbal instruction on renal diet and carb counting. Discussed sources of carbs, carb choices, label reading, sources of potassium, and renal diet restrictions. Pt voiced understanding. Pt denied having any questions.    · Nutrition-Focused Physical Findings: Last BM 9/28  · Wound Type: Skin Tears  · Current Nutrition Therapies:  · Oral Diet Orders: Carb Control 4 Carbs/Meal, Low Potassium   · Oral Diet intake: 26-50%, 51-75%, %  · Oral Nutrition Supplement (ONS) Orders: Diabetic Oral Supplement  · ONS

## 2018-10-08 NOTE — PROGRESS NOTES
Pietro Live MD  Subjective  Subjective: Pt seated in w/c in therapy gym upon arrival and agreeable to PT session  Pain Screening  Patient Currently in Pain: Denies  Vital Signs  Patient Currently in Pain: Denies          Orientation  Orientation  Overall Orientation Status: Within Functional Limits     Objective   Bed mobility  Supine to Sit: Supervision  Sit to Supine: Supervision     Transfers  Sit to Stand: Supervision  Stand to sit: Supervision  Bed to Chair: Supervision (using RW)     Ambulation  Ambulation?: Yes  Ambulation 1  Surface: level tile;carpet  Device: Rolling Walker  Assistance: Supervision  Quality of Gait: Step through pattern with toe out position but able to achieve heel strike with heel toe quality. Meredith slow without LOB. Distance: 209ft + 350ft  Comments: pt completes multiple turns in hallway and through therapy gym    Stairs/Curb  Stairs?: Yes  Stairs  # Steps : 4  Stairs Height: 6\"  Rails: Bilateral  Device: No Device  Assistance: Stand by assistance  Comment: via non-reciprocal pattern, no LOB; good safety awareness           Exercises  Hamstring Sets: resisted knee flexion x 20 reps (blue theraband)  Gluteal Sets: x 20 BLE  Hip Flexion: seated marching x 20 reps BLE (2#)  Hip Abduction: seated clamshells x 20 reps (squeezing ball); resisted hip abd x 20 reps (blue theraband)  Knee Long Arc Quad: x 20 reps BLE (2#)  Ankle Pumps: x 20 BLE  Other exercises  Other exercises?: Yes  Other exercises 1: Sci-fit stepper x 8 mins (level 1), average 80 steps/min, using BUE/BLE-- in order to increase strength and activity tolerance                        Addendum: 2nd Session  *Pt seated in w/c in gym upon arrival and agreeable to PT session. Denies pain. Transfers:   Sit to stand with supervision up to RW. Pt completes x 6 reps total during session  Gait:   Pt ambulates 190ft with supervision using RW.  Pt demo's step through pattern with toe out position but able to achieve heel strike with heel toe quality. Meredith slow without LOB  Standing balance:   Ball toss x 2 mins with SBA without BUE support; increased postural sway however no LOB   Standing bean bag toss onto target x 6 mins standing tolerance without UE support for balance; mild postural sway however no LOB  *Pt seated in w/c in room at end of session with needs in reach and chair alarm on. RN notified. Assessment   Body structures, Functions, Activity limitations: Decreased functional mobility ; Decreased strength;Decreased endurance;Decreased balance  Assessment: Pt continues to demo good progress with activity tolerance and independence with mobility. Pt able to safely complete all mobility with supervision-SBA using RW. Remains limited by balance deficits although improving compared to admission. Will continue to progress functional mobility as appropriate.   Treatment Diagnosis: impaired strength and functional mobility  Prognosis: Good  Patient Education: balance training, gait, stairs, LE exercise  REQUIRES PT FOLLOW UP: Yes  Activity Tolerance  Activity Tolerance: Patient Tolerated treatment well       Goals  Short term goals  Time Frame for Short term goals: 5 days  Short term goal 1: Pt will transfer supine to/from sit with supervision-- GOAL MET 10/4  Short term goal 2: Pt will transfer sit to/from stand with SBA-- GOAL MET 10/3  Short term goal 3: Pt will ambulate 50ft with SBA using LRAD-- GOAL MET 10/3  Short term goal 4: Pt will ascend/descend 5 steps with SBA using unilateral rail-- GOAL MET 10/3  Long term goals  Time Frame for Long term goals : 10 days  Long term goal 1: Pt will transfer supine to/from sit independently, with HOB flat, no rails  Long term goal 2: Pt will transfer sit to/from stand with mod I up to LRAD  Long term goal 3: Pt will ambulate 150ft with mod I using LRAD in order to access home and community environments  Long term goal 4: Pt will ascend/descend 1 flight of steps with supervision using unilateral rail in order to access multi-level home  Long term goal 5: Pt will complete car transfer with supervision in order to access dialysis and physician appointments at d/c  Patient Goals   Patient goals : \"to get up my steps\", \"to be safer\"    Plan    Plan  Times per week: 5 out of 7 days/week  Plan weeks: 10 days  Current Treatment Recommendations: Strengthening, Balance Training, Functional Mobility Training, Transfer Training, Gait Training, Stair training, Wheelchair Mobility Training, Endurance Training, Home Exercise Program, Safety Education & Training, Patient/Caregiver Education & Training, Equipment Evaluation, Education, & procurement  Safety Devices  Type of devices:  All fall risk precautions in place, Bed alarm in place, Call light within reach, Left in bed, Nurse notified     Therapy Time   Individual Concurrent Group Co-treatment   Time In 0900         Time Out 1000         Minutes 60         Timed Code Treatment Minutes: Bina Time:   Individual Concurrent Group Co-treatment   Time In 1100         Time Out 1130         Minutes 30         Timed Code Treatment Minutes:  30    Total Treatment Minutes:  [address], PT, DPT #[id number]

## 2018-10-09 LAB
GLUCOSE BLD-MCNC: 138 MG/DL (ref 70–99)
GLUCOSE BLD-MCNC: 256 MG/DL (ref 70–99)
GLUCOSE BLD-MCNC: 266 MG/DL (ref 70–99)
GLUCOSE BLD-MCNC: 291 MG/DL (ref 70–99)
PERFORMED ON: ABNORMAL

## 2018-10-09 PROCEDURE — 1280000000 HC REHAB R&B

## 2018-10-09 PROCEDURE — 6370000000 HC RX 637 (ALT 250 FOR IP): Performed by: INTERNAL MEDICINE

## 2018-10-09 PROCEDURE — 97110 THERAPEUTIC EXERCISES: CPT

## 2018-10-09 PROCEDURE — 6370000000 HC RX 637 (ALT 250 FOR IP): Performed by: PHYSICAL MEDICINE & REHABILITATION

## 2018-10-09 PROCEDURE — 97116 GAIT TRAINING THERAPY: CPT

## 2018-10-09 PROCEDURE — 97535 SELF CARE MNGMENT TRAINING: CPT

## 2018-10-09 PROCEDURE — 97530 THERAPEUTIC ACTIVITIES: CPT

## 2018-10-09 RX ORDER — HYDRALAZINE HYDROCHLORIDE 25 MG/1
100 TABLET, FILM COATED ORAL EVERY 8 HOURS SCHEDULED
Status: DISCONTINUED | OUTPATIENT
Start: 2018-10-09 | End: 2018-10-11 | Stop reason: HOSPADM

## 2018-10-09 RX ADMIN — HYDRALAZINE HYDROCHLORIDE 100 MG: 25 TABLET, FILM COATED ORAL at 20:47

## 2018-10-09 RX ADMIN — MAGNESIUM HYDROXIDE 30 ML: 400 SUSPENSION ORAL at 15:44

## 2018-10-09 RX ADMIN — INSULIN GLARGINE 15 UNITS: 100 INJECTION, SOLUTION SUBCUTANEOUS at 20:52

## 2018-10-09 RX ADMIN — DOXAZOSIN 4 MG: 2 TABLET ORAL at 20:47

## 2018-10-09 RX ADMIN — OXYCODONE HYDROCHLORIDE 5 MG: 5 TABLET ORAL at 22:38

## 2018-10-09 RX ADMIN — CLONIDINE HYDROCHLORIDE 0.2 MG: 0.1 TABLET ORAL at 09:30

## 2018-10-09 RX ADMIN — CLONIDINE HYDROCHLORIDE 0.2 MG: 0.1 TABLET ORAL at 14:58

## 2018-10-09 RX ADMIN — INSULIN LISPRO 3 UNITS: 100 INJECTION, SOLUTION INTRAVENOUS; SUBCUTANEOUS at 17:12

## 2018-10-09 RX ADMIN — DOCUSATE SODIUM 100 MG: 100 CAPSULE, LIQUID FILLED ORAL at 09:30

## 2018-10-09 RX ADMIN — TRAZODONE HYDROCHLORIDE 100 MG: 50 TABLET ORAL at 22:38

## 2018-10-09 RX ADMIN — NIFEDIPINE 90 MG: 30 TABLET, FILM COATED, EXTENDED RELEASE ORAL at 09:31

## 2018-10-09 RX ADMIN — INSULIN LISPRO 3 UNITS: 100 INJECTION, SOLUTION INTRAVENOUS; SUBCUTANEOUS at 11:50

## 2018-10-09 RX ADMIN — CINACALCET HYDROCHLORIDE 30 MG: 30 TABLET, COATED ORAL at 09:30

## 2018-10-09 RX ADMIN — NIFEDIPINE 90 MG: 30 TABLET, FILM COATED, EXTENDED RELEASE ORAL at 20:46

## 2018-10-09 RX ADMIN — LEVOTHYROXINE SODIUM 25 MCG: 25 TABLET ORAL at 06:00

## 2018-10-09 RX ADMIN — HYDRALAZINE HYDROCHLORIDE 100 MG: 25 TABLET, FILM COATED ORAL at 14:58

## 2018-10-09 RX ADMIN — DOCUSATE SODIUM 100 MG: 100 CAPSULE, LIQUID FILLED ORAL at 20:47

## 2018-10-09 RX ADMIN — OXYCODONE HYDROCHLORIDE 5 MG: 5 TABLET ORAL at 06:00

## 2018-10-09 RX ADMIN — INSULIN LISPRO 2 UNITS: 100 INJECTION, SOLUTION INTRAVENOUS; SUBCUTANEOUS at 20:51

## 2018-10-09 RX ADMIN — HYDRALAZINE HYDROCHLORIDE 50 MG: 25 TABLET, FILM COATED ORAL at 06:00

## 2018-10-09 RX ADMIN — METOPROLOL TARTRATE 100 MG: 50 TABLET ORAL at 20:47

## 2018-10-09 RX ADMIN — METOPROLOL TARTRATE 100 MG: 50 TABLET ORAL at 09:30

## 2018-10-09 RX ADMIN — MUPIROCIN: 20 OINTMENT TOPICAL at 17:11

## 2018-10-09 RX ADMIN — OXYCODONE HYDROCHLORIDE 5 MG: 5 TABLET ORAL at 18:34

## 2018-10-09 RX ADMIN — PANTOPRAZOLE SODIUM 40 MG: 40 TABLET, DELAYED RELEASE ORAL at 06:00

## 2018-10-09 RX ADMIN — CLONIDINE HYDROCHLORIDE 0.2 MG: 0.1 TABLET ORAL at 20:46

## 2018-10-09 ASSESSMENT — PAIN DESCRIPTION - PAIN TYPE: TYPE: CHRONIC PAIN

## 2018-10-09 ASSESSMENT — PAIN SCALES - GENERAL
PAINLEVEL_OUTOF10: 3
PAINLEVEL_OUTOF10: 0
PAINLEVEL_OUTOF10: 7
PAINLEVEL_OUTOF10: 6
PAINLEVEL_OUTOF10: 6

## 2018-10-09 ASSESSMENT — PAIN DESCRIPTION - LOCATION: LOCATION: LEG

## 2018-10-09 NOTE — PROGRESS NOTES
upon arrival and agreeable to PT session. Denies pain. Transfers:   Sit to stand with supervision  Gait:   190ft with supervision using RW (over level tile). Pt demo's step through pattern with toe out position but able to achieve heel strike with heel toe quality. Meredith slow without LOB. 325ft with supervision using RW (over outdoors, uneven surface). Pt demo's step through pattern with toe out position but able to achieve min heel strike with heel toe quality. Meredith slow without LOB. Bed mobility:   Sit to supine independently  *Pt supine in bed at end of session with needs in reach and bed alarm on. RN notified. Assessment   Body structures, Functions, Activity limitations: Decreased functional mobility ; Decreased strength;Decreased endurance;Decreased balance  Assessment: Pt continues to demo good progress with activity tolerance and independence with mobility. Pt able to safely complete all mobility with supervision using RW. Several therapeutic rest breaks required during session although fewer breaks compared to initial evaluation. Will continue to progress functional mobility as appropriate.   Treatment Diagnosis: impaired strength and functional mobility  Prognosis: Good  Patient Education: balance training, gait, stairs, LE exercise  REQUIRES PT FOLLOW UP: Yes  Activity Tolerance  Activity Tolerance: Patient Tolerated treatment well       Goals  Short term goals  Time Frame for Short term goals: 5 days  Short term goal 1: Pt will transfer supine to/from sit with supervision-- GOAL MET 10/4  Short term goal 2: Pt will transfer sit to/from stand with SBA-- GOAL MET 10/3  Short term goal 3: Pt will ambulate 50ft with SBA using LRAD-- GOAL MET 10/3  Short term goal 4: Pt will ascend/descend 5 steps with SBA using unilateral rail-- GOAL MET 10/3  Long term goals  Time Frame for Long term goals : 10 days  Long term goal 1: Pt will transfer supine to/from sit independently, with HOB flat, no rails--

## 2018-10-09 NOTE — PROGRESS NOTES
Patient resting in bed after being up in wheel chair. PCA assisted patient with shower due to complaints of itching. Patient does not believe Benadryl is helping to prevent itching any more. Shower helped relieve symptoms of itching. No complaints now of pain, had 2000 prn pain medication for generalized discomfort, effective within the hour.

## 2018-10-09 NOTE — PROGRESS NOTES
Tolerance: Patient Tolerated treatment well  Safety Devices  Safety Devices in place: Yes  Type of devices: Left in chair;Gait belt;Nurse notified (left with PT)          Plan   Plan  Times per week: 5/7 days/week  Plan weeks: 10 days  Current Treatment Recommendations: Strengthening, Endurance Training, Patient/Caregiver Education & Training, Self-Care / ADL, Equipment Evaluation, Education, & procurement, ROM, Balance Training, Functional Mobility Training, Safety Education & Training, Home Management Training    Goals  Short term goals  Time Frame for Short term goals: 5 days  Short term goal 1: Pt will perform functional transfers with supervision. - GOAL MET 10/6  Short term goal 2: Pt will complete BADLs with supervision. Short term goal 3: Pt will complete simple meal prep with SBA. GOAL MET 10/3/18  Short term goal 4: Pt will perform mock shopping task and standing for >5 minutes with supervision. GOAL MET 10/3/18  Long term goals  Time Frame for Long term goals : 10 days  Long term goal 1: Pt will complete functional transfers with mod I.  Long term goal 2: Pt will perform BADLs with mod I.  Long term goal 3: Pt will complete simple meal prep with mod I. GOAL MET 10/9/18  Long term goal 4: Pt will perform light cleaning with supervision.  GOAL MET 10/9/18  Patient Goals   Patient goals : \"go home, be more comfortable on the steps\"       Therapy Time   Individual Concurrent Group Co-treatment   Time In 0730         Time Out 0830         Minutes 60         Timed Code Treatment Minutes: 60 Minutes    Second Session Therapy Time:   Individual Concurrent Group Co-treatment   Time In 1030         Time Out 1108         Minutes 38           Timed Code Treatment Minutes:  38 Minutes    Total Treatment Minutes:  98 minutes      Emily Preston, OT

## 2018-10-09 NOTE — PROGRESS NOTES
Department of Physical Medicine & Rehabilitation  Progress Note    Patient Identification:  Krysten Stewart  [id number]  : 1970  Admit date: 10/1/2018    Chief Complaint: DKA, type 1, not at goal [facility])    Subjective:   No acute events overnight. Patient seen this am working on standing tolerance with OT. She is feeling well. Still with itching which she attributes to taking pain medication. We discussed weaning this back to her home regimen. ROS: No f/c, cp     Objective:  Patient Vitals for the past 24 hrs:   BP Temp Temp src Pulse Resp SpO2 Weight   10/09/18 0920 (!) 166/81 98 °F (36.7 °C) Oral 85 16 100 % -   10/09/18 0600 - - - - - - 119 lb 0.8 oz (54 kg)   10/09/18 0545 (!) 173/79 - - - - - -   10/08/18 2119 (!) 169/74 99.5 °F (37.5 °C) Oral 69 16 98 % -   10/08/18 1815 (!) 170/77 98.4 °F (36.9 °C) - 68 - - 121 lb 7.6 oz (55.1 kg)   10/08/18 1412 (!) 156/86 97.6 °F (36.4 °C) - 57 - - 125 lb 7.1 oz (56.9 kg)     Const: Alert. No distress, pleasant. HEENT: Normocephalic, atraumatic. Normal sclera/conjunctiva. MMM. CV: Regular rate and rhythm. Resp: No respiratory distress. Lungs CTAB. Abd: NABS+. Soft, nontender, nondistended   Ext: No edema. Neuro: Alert, oriented, appropriately interactive. Psych: Cooperative, appropriate mood and affect    Laboratory data: Available via EMR.    Last 24 hour lab  Recent Results (from the past 24 hour(s))   POCT Glucose    Collection Time: 10/08/18 11:36 AM   Result Value Ref Range    POC Glucose 77 70 - 99 mg/dl    Performed on ACCU-CHEK    POCT Glucose    Collection Time: 10/08/18  7:32 PM   Result Value Ref Range    POC Glucose 84 70 - 99 mg/dl    Performed on ACCU-CHEK    POCT Glucose    Collection Time: 10/08/18  9:29 PM   Result Value Ref Range    POC Glucose 193 (H) 70 - 99 mg/dl    Performed on ACCU-CHEK    POCT Glucose    Collection Time: 10/08/18 10:52 PM   Result Value Ref Range    POC Glucose 161 (H) 70 - 99 mg/dl    Performed on ACCU-CHEK POCT Glucose    Collection Time: 10/09/18  5:59 AM   Result Value Ref Range    POC Glucose 138 (H) 70 - 99 mg/dl    Performed on ACCU-CHEK        Therapy progress:  PT  Position Activity Restriction  Other position/activity restrictions: up as tolerated  Objective     Sit to Stand: Supervision  Stand to sit: Supervision  Bed to Chair: Supervision (using RW)  Stand Pivot Transfers: Supervision (using RW)  Device: Rolling Walker  Assistance: Supervision  Distance: 245ft + 325ft  OT  PT Equipment Recommendations  Equipment Needed: No  Other: pt has RW, cane, and 4WW at home  Toilet - Technique: Ambulating  Equipment Used: Standard toilet  Assessment        SLP          Body mass index is 20.43 kg/m². Assessment and Plan:  Impairments: decreased endurance, weakness, decreased balance, cognition     Debility - PT/OT to address endurance, strength, compensatory strategies, equipment     Metabolic Encephalopathy - Due to DKA. Now resolving. Regulate sleep/wake. Emphasis on routine. SLP.      Uncontrolled DM1 - DKA resolved. Decreased lantus, dc'ed prandial, ISS. Education and counseling to improve compliance.      HTN - nifedipine, metoprolol, doxazosin, clonidine; running high; defer to nephrology.     ESRD on HD MWF - nephrology consulted     Chronic troponin elevation - likely related to HTN and ESRD.  No chest pain, EKG not suggestive of ischemia.  No further workup.     Hypothyroidism - levothyroxine.     PUD/GERD - Pantoprazole     IBS - home eluxadoline if patient would like to have family bring in     Safety - fall precautions    Sleep - Trazodone, melatonin     FULL CODE    Anticipated Dispo: home with   Services:  PT, OT, RN  DME: none  ELOS: 10/11      Kasia Hurst.  Monika Barlow MD 10/9/2018, 9:47 AM

## 2018-10-10 LAB
ALBUMIN SERPL-MCNC: 4.1 G/DL (ref 3.4–5)
ANION GAP SERPL CALCULATED.3IONS-SCNC: 13 MMOL/L (ref 3–16)
BUN BLDV-MCNC: 28 MG/DL (ref 7–20)
CALCIUM SERPL-MCNC: 9.5 MG/DL (ref 8.3–10.6)
CHLORIDE BLD-SCNC: 95 MMOL/L (ref 99–110)
CO2: 23 MMOL/L (ref 21–32)
CREAT SERPL-MCNC: 4 MG/DL (ref 0.6–1.1)
GFR AFRICAN AMERICAN: 14
GFR NON-AFRICAN AMERICAN: 12
GLUCOSE BLD-MCNC: 159 MG/DL (ref 70–99)
GLUCOSE BLD-MCNC: 193 MG/DL (ref 70–99)
GLUCOSE BLD-MCNC: 210 MG/DL (ref 70–99)
GLUCOSE BLD-MCNC: 226 MG/DL (ref 70–99)
HCT VFR BLD CALC: 32.9 % (ref 36–48)
HEMOGLOBIN: 10.7 G/DL (ref 12–16)
MCH RBC QN AUTO: 30.6 PG (ref 26–34)
MCHC RBC AUTO-ENTMCNC: 32.5 G/DL (ref 31–36)
MCV RBC AUTO: 94.3 FL (ref 80–100)
PDW BLD-RTO: 13.9 % (ref 12.4–15.4)
PERFORMED ON: ABNORMAL
PHOSPHORUS: 2.4 MG/DL (ref 2.5–4.9)
PLATELET # BLD: 149 K/UL (ref 135–450)
PMV BLD AUTO: 8 FL (ref 5–10.5)
POTASSIUM SERPL-SCNC: 5.2 MMOL/L (ref 3.5–5.1)
RBC # BLD: 3.48 M/UL (ref 4–5.2)
SODIUM BLD-SCNC: 131 MMOL/L (ref 136–145)
WBC # BLD: 5.1 K/UL (ref 4–11)

## 2018-10-10 PROCEDURE — 97535 SELF CARE MNGMENT TRAINING: CPT

## 2018-10-10 PROCEDURE — 85027 COMPLETE CBC AUTOMATED: CPT

## 2018-10-10 PROCEDURE — 97110 THERAPEUTIC EXERCISES: CPT

## 2018-10-10 PROCEDURE — 80069 RENAL FUNCTION PANEL: CPT

## 2018-10-10 PROCEDURE — 5A1D70Z PERFORMANCE OF URINARY FILTRATION, INTERMITTENT, LESS THAN 6 HOURS PER DAY: ICD-10-PCS | Performed by: INTERNAL MEDICINE

## 2018-10-10 PROCEDURE — 6370000000 HC RX 637 (ALT 250 FOR IP): Performed by: PHYSICAL MEDICINE & REHABILITATION

## 2018-10-10 PROCEDURE — 6360000002 HC RX W HCPCS: Performed by: PHYSICAL MEDICINE & REHABILITATION

## 2018-10-10 PROCEDURE — 97116 GAIT TRAINING THERAPY: CPT

## 2018-10-10 PROCEDURE — 97530 THERAPEUTIC ACTIVITIES: CPT

## 2018-10-10 PROCEDURE — 36415 COLL VENOUS BLD VENIPUNCTURE: CPT

## 2018-10-10 PROCEDURE — 6370000000 HC RX 637 (ALT 250 FOR IP): Performed by: INTERNAL MEDICINE

## 2018-10-10 PROCEDURE — 1280000000 HC REHAB R&B

## 2018-10-10 RX ORDER — INSULIN GLARGINE 100 [IU]/ML
15 INJECTION, SOLUTION SUBCUTANEOUS NIGHTLY
Qty: 1 VIAL | Refills: 3 | Status: SHIPPED | OUTPATIENT
Start: 2018-10-10

## 2018-10-10 RX ORDER — HYDRALAZINE HYDROCHLORIDE 100 MG/1
100 TABLET, FILM COATED ORAL EVERY 8 HOURS SCHEDULED
Qty: 90 TABLET | Refills: 3 | Status: SHIPPED | OUTPATIENT
Start: 2018-10-10

## 2018-10-10 RX ORDER — PANTOPRAZOLE SODIUM 40 MG/1
40 TABLET, DELAYED RELEASE ORAL
Qty: 30 TABLET | Refills: 3 | Status: SHIPPED | OUTPATIENT
Start: 2018-10-11

## 2018-10-10 RX ORDER — DOXAZOSIN 2 MG/1
4 TABLET ORAL NIGHTLY
Qty: 30 TABLET | Refills: 3 | Status: SHIPPED | OUTPATIENT
Start: 2018-10-10

## 2018-10-10 RX ADMIN — DOXERCALCIFEROL 2 MCG: 2 INJECTION, SOLUTION INTRAVENOUS at 18:48

## 2018-10-10 RX ADMIN — METOPROLOL TARTRATE 100 MG: 50 TABLET ORAL at 08:27

## 2018-10-10 RX ADMIN — DOCUSATE SODIUM 100 MG: 100 CAPSULE, LIQUID FILLED ORAL at 08:27

## 2018-10-10 RX ADMIN — INSULIN LISPRO 2 UNITS: 100 INJECTION, SOLUTION INTRAVENOUS; SUBCUTANEOUS at 07:15

## 2018-10-10 RX ADMIN — HYDRALAZINE HYDROCHLORIDE 100 MG: 25 TABLET, FILM COATED ORAL at 06:09

## 2018-10-10 RX ADMIN — METOPROLOL TARTRATE 100 MG: 50 TABLET ORAL at 20:54

## 2018-10-10 RX ADMIN — CINACALCET HYDROCHLORIDE 30 MG: 30 TABLET, COATED ORAL at 08:32

## 2018-10-10 RX ADMIN — HYDRALAZINE HYDROCHLORIDE 100 MG: 25 TABLET, FILM COATED ORAL at 20:55

## 2018-10-10 RX ADMIN — TRAZODONE HYDROCHLORIDE 100 MG: 50 TABLET ORAL at 22:12

## 2018-10-10 RX ADMIN — OXYCODONE HYDROCHLORIDE 5 MG: 5 TABLET ORAL at 23:01

## 2018-10-10 RX ADMIN — NIFEDIPINE 90 MG: 30 TABLET, FILM COATED, EXTENDED RELEASE ORAL at 08:27

## 2018-10-10 RX ADMIN — OXYCODONE HYDROCHLORIDE 10 MG: 5 TABLET ORAL at 08:28

## 2018-10-10 RX ADMIN — INSULIN LISPRO 1 UNITS: 100 INJECTION, SOLUTION INTRAVENOUS; SUBCUTANEOUS at 22:22

## 2018-10-10 RX ADMIN — MAGNESIUM HYDROXIDE 30 ML: 400 SUSPENSION ORAL at 21:11

## 2018-10-10 RX ADMIN — CLONIDINE HYDROCHLORIDE 0.2 MG: 0.1 TABLET ORAL at 20:55

## 2018-10-10 RX ADMIN — OXYCODONE HYDROCHLORIDE 10 MG: 5 TABLET ORAL at 18:00

## 2018-10-10 RX ADMIN — DOCUSATE SODIUM 100 MG: 100 CAPSULE, LIQUID FILLED ORAL at 20:55

## 2018-10-10 RX ADMIN — PANTOPRAZOLE SODIUM 40 MG: 40 TABLET, DELAYED RELEASE ORAL at 06:09

## 2018-10-10 RX ADMIN — MAGNESIUM HYDROXIDE 30 ML: 400 SUSPENSION ORAL at 08:27

## 2018-10-10 RX ADMIN — NIFEDIPINE 90 MG: 30 TABLET, FILM COATED, EXTENDED RELEASE ORAL at 20:54

## 2018-10-10 RX ADMIN — MUPIROCIN: 20 OINTMENT TOPICAL at 08:32

## 2018-10-10 RX ADMIN — LEVOTHYROXINE SODIUM 25 MCG: 25 TABLET ORAL at 06:09

## 2018-10-10 RX ADMIN — INSULIN GLARGINE 15 UNITS: 100 INJECTION, SOLUTION SUBCUTANEOUS at 22:23

## 2018-10-10 RX ADMIN — DOXAZOSIN 4 MG: 2 TABLET ORAL at 20:55

## 2018-10-10 RX ADMIN — CLONIDINE HYDROCHLORIDE 0.2 MG: 0.1 TABLET ORAL at 08:27

## 2018-10-10 RX ADMIN — INSULIN LISPRO 1 UNITS: 100 INJECTION, SOLUTION INTRAVENOUS; SUBCUTANEOUS at 12:33

## 2018-10-10 ASSESSMENT — PAIN DESCRIPTION - ONSET: ONSET: ON-GOING

## 2018-10-10 ASSESSMENT — PAIN DESCRIPTION - ORIENTATION
ORIENTATION: RIGHT;LEFT
ORIENTATION: RIGHT;LEFT

## 2018-10-10 ASSESSMENT — PAIN DESCRIPTION - LOCATION
LOCATION: LEG
LOCATION: LEG;FOOT

## 2018-10-10 ASSESSMENT — PAIN DESCRIPTION - PAIN TYPE
TYPE: CHRONIC PAIN
TYPE: CHRONIC PAIN

## 2018-10-10 ASSESSMENT — PAIN SCALES - GENERAL
PAINLEVEL_OUTOF10: 8
PAINLEVEL_OUTOF10: 5
PAINLEVEL_OUTOF10: 10
PAINLEVEL_OUTOF10: 6
PAINLEVEL_OUTOF10: 7

## 2018-10-10 ASSESSMENT — PAIN DESCRIPTION - PROGRESSION: CLINICAL_PROGRESSION: NOT CHANGED

## 2018-10-10 ASSESSMENT — PAIN DESCRIPTION - DESCRIPTORS: DESCRIPTORS: ACHING

## 2018-10-10 ASSESSMENT — PAIN DESCRIPTION - FREQUENCY: FREQUENCY: CONTINUOUS

## 2018-10-10 NOTE — PATIENT CARE CONFERENCE
Requires setup/supervision/cues and/or staff applies TEDS/prosthesis/brace only  Toiletin - Requires steadying assistance only  Toilet Transfer: 4 - Requires steadying assistance only < 25% assist  Shower Transfer: 4 - Minimal contact assistance, pt. expends 75% or more effort    FIMS: current conference  Eatin - Patient feeds self  Groomin - Independent with all tasks using assistive device  Bathin - Able to bathe all 10 areas with device  Dressing-Upper: 7 - Patient independently dresses upper body  Dressing-Lower: 6 - Independent with device/prosthesis  Toiletin - Requires device (grab bar/walker/etc.)  Toilet Transfer: 6 - Independent with device (grab bar/walker/slide bar)  Shower Transfer: 6 - Modified independence      Assessment: Pt agreeable to OT session. Pt performed all ADLs with mod I and bathroom mobility with mod I and use of RW. Pt with good endurance for standing to bathe and groom. Pt with good safety for all tasks. Pt completed laundry task with mod I to load washer. SPEECH THERAPY (intentionally left blank if not actively being seen by this service):       NUTRITION  Weight: 123 lb 8 oz (56 kg) / Body mass index is 21.2 kg/m². Diet Order: DIET CARB CONTROL; Carb Control: 4 carb choices (60 gms)/meal; Low Potassium  Dietary Nutrition Supplements: Diabetic Oral Supplement, Renal Oral Supplement  PO Meals Eaten (%): 51 - 75%  Education: Provided pt with written and verbal instruction on renal diet and carb counting. Discussed sources of carbs, carb choices, label reading, sources of potassium, and renal diet restrictions. Pt voiced understanding. Pt denied having any questions. CASE MANAGEMENT  Assessment: Patient is going home with [facility].          Interdisciplinary Goals:   1.) Safe Discharge Home  2.)  3.)  4.)  5.)    Discharge Plan   Estimated discharge date: 10-  Destination: home health  Pass:No  Services at Discharge: [address] [place] [address]

## 2018-10-10 NOTE — PLAN OF CARE
Problem: Pain:  Goal: Control of acute pain  Control of acute pain   Outcome: Ongoing   Assess pain throughout shift, apply/assess non pharmacological interventions, administer medications per MD orders. Pain,leg, treated effectively with non pharm and PRN medication interventions. Monitor pain; notify MD if pain persist or worsens over extended period of time.

## 2018-10-10 NOTE — PROGRESS NOTES
Department of Physical Medicine & Rehabilitation  Progress Note    Patient Identification:  Tori Jamison  [id number]  : 1970  Admit date: 10/1/2018    Chief Complaint: DKA, type 1, not at goal [facility])    Subjective:   No acute events overnight. Feeling ready for home tomorrow. No new c/o. ROS: No f/c, cp     Objective:  Patient Vitals for the past 24 hrs:   BP Temp Temp src Pulse Resp SpO2 Weight   10/10/18 0825 (!) 147/74 97.7 °F (36.5 °C) Oral 60 16 99 % -   10/10/18 0607 (!) 163/80 - - 61 - 99 % -   10/10/18 0353 - - - - - - 123 lb 8 oz (56 kg)   10/09/18 2041 (!) 154/84 98.7 °F (37.1 °C) Oral 64 16 98 % -   10/09/18 1458 (!) 164/76 - - - - - -     Const: Alert. No distress, pleasant. HEENT: Normocephalic, atraumatic. Normal sclera/conjunctiva. MMM. CV: Regular rate and rhythm. Resp: No respiratory distress. Lungs CTAB. Abd: NABS+. Soft, nontender, nondistended   Ext: No edema. Neuro: Alert, oriented, appropriately interactive. Psych: Cooperative, appropriate mood and affect    Laboratory data: Available via EMR.    Last 24 hour lab  Recent Results (from the past 24 hour(s))   POCT Glucose    Collection Time: 10/09/18  4:55 PM   Result Value Ref Range    POC Glucose 291 (H) 70 - 99 mg/dl    Performed on ACCU-CHEK    POCT Glucose    Collection Time: 10/09/18  8:24 PM   Result Value Ref Range    POC Glucose 266 (H) 70 - 99 mg/dl    Performed on ACCU-CHEK    POCT Glucose    Collection Time: 10/10/18  6:16 AM   Result Value Ref Range    POC Glucose 210 (H) 70 - 99 mg/dl    Performed on ACCU-CHEK    Renal function panel    Collection Time: 10/10/18  8:23 AM   Result Value Ref Range    Sodium 131 (L) 136 - 145 mmol/L    Potassium 5.2 (H) 3.5 - 5.1 mmol/L    Chloride 95 (L) 99 - 110 mmol/L    CO2 23 21 - 32 mmol/L    Anion Gap 13 3 - 16    Glucose 226 (H) 70 - 99 mg/dL    BUN 28 (H) 7 - 20 mg/dL    CREATININE 4.0 (H) 0.6 - 1.1 mg/dL    GFR Non- 12 (A) >60    GFR

## 2018-10-11 VITALS
WEIGHT: 125.22 LBS | HEIGHT: 64 IN | HEART RATE: 66 BPM | SYSTOLIC BLOOD PRESSURE: 177 MMHG | OXYGEN SATURATION: 99 % | TEMPERATURE: 98 F | BODY MASS INDEX: 21.38 KG/M2 | DIASTOLIC BLOOD PRESSURE: 85 MMHG | RESPIRATION RATE: 20 BRPM

## 2018-10-11 LAB
GLUCOSE BLD-MCNC: 128 MG/DL (ref 70–99)
GLUCOSE BLD-MCNC: 214 MG/DL (ref 70–99)
PERFORMED ON: ABNORMAL
PERFORMED ON: ABNORMAL

## 2018-10-11 PROCEDURE — 6370000000 HC RX 637 (ALT 250 FOR IP): Performed by: INTERNAL MEDICINE

## 2018-10-11 PROCEDURE — 6370000000 HC RX 637 (ALT 250 FOR IP): Performed by: PHYSICAL MEDICINE & REHABILITATION

## 2018-10-11 RX ADMIN — PANTOPRAZOLE SODIUM 40 MG: 40 TABLET, DELAYED RELEASE ORAL at 06:25

## 2018-10-11 RX ADMIN — OXYCODONE HYDROCHLORIDE 10 MG: 5 TABLET ORAL at 10:22

## 2018-10-11 RX ADMIN — NIFEDIPINE 90 MG: 30 TABLET, FILM COATED, EXTENDED RELEASE ORAL at 10:20

## 2018-10-11 RX ADMIN — LEVOTHYROXINE SODIUM 25 MCG: 25 TABLET ORAL at 06:25

## 2018-10-11 RX ADMIN — MUPIROCIN: 20 OINTMENT TOPICAL at 12:38

## 2018-10-11 RX ADMIN — DOCUSATE SODIUM 100 MG: 100 CAPSULE, LIQUID FILLED ORAL at 10:21

## 2018-10-11 RX ADMIN — LOPERAMIDE HYDROCHLORIDE 2 MG: 2 CAPSULE ORAL at 04:12

## 2018-10-11 RX ADMIN — METOPROLOL TARTRATE 100 MG: 50 TABLET ORAL at 10:21

## 2018-10-11 RX ADMIN — CINACALCET HYDROCHLORIDE 30 MG: 30 TABLET, COATED ORAL at 12:37

## 2018-10-11 RX ADMIN — INSULIN LISPRO 2 UNITS: 100 INJECTION, SOLUTION INTRAVENOUS; SUBCUTANEOUS at 12:39

## 2018-10-11 RX ADMIN — HYDRALAZINE HYDROCHLORIDE 100 MG: 25 TABLET, FILM COATED ORAL at 06:25

## 2018-10-11 RX ADMIN — CLONIDINE HYDROCHLORIDE 0.2 MG: 0.1 TABLET ORAL at 10:21

## 2018-10-11 ASSESSMENT — PAIN SCALES - GENERAL
PAINLEVEL_OUTOF10: 8
PAINLEVEL_OUTOF10: 8

## 2018-10-11 NOTE — PROGRESS NOTES
Discharge instructions were reviewed with the patient and questions were answered to her satisfaction. Prescriptions were provided as per the M.D. Personal belongings were collected. The patient was discharged to home in no distress. Her friend is here to drive her home via personal vehicle. She was taken out by staff per wheelchair.

## 2018-10-11 NOTE — DISCHARGE SUMMARY
Physical Medicine & Rehabilitation  Discharge Summary     Patient Identification:  Matt Linares  : 1970  Admit date: 10/1/2018  Discharge date:  10/11/2018  Attending provider: Loco Bangura MD        Primary care provider: Gurmeet De La Garza MD     Discharge Diagnoses:   Patient Active Problem List   Diagnosis    Encephalopathy    HTN (hypertension)    ESRD (end stage renal disease) on dialysis ([address]    Hyperkalemia    HTN (hypertension), malignant    Upper airway obstruction    Respiratory failure ([address]    Vocal cord mass    Metabolic encephalopathy    Altered mental state    Hyponatremia    Fever    Closed left ankle fracture    DKA, type 1 ([address]    Diabetic ketoacidosis without coma ([address]    Upper gastrointestinal bleeding    Lactic acidosis    Acute gastritis with hemorrhage    Type 1 diabetes mellitus with diabetic chronic kidney disease ([address]    Closed fracture of left tibia and fibula    Fall at home    Acute encephalopathy    Acute respiratory failure with hypoxia ([address]    Encephalopathy, hypertensive    Encephalopathy, metabolic    GW6/VZZI    Pancreatic insufficiency    Hypertensive urgency    Thrombocytopenia (HCC)    GERD/PUD with hx of upper GI bleed    History of kidney transplant    H/O pancreas transplant ([address]    Hypothyroidism    Diabetic peripheral neuropathy (HCC)    IBSd on [employer]    Nausea vomiting and diarrhea    DKA, type 1, not at goal [facility])       History of Present Illness/Acute Hospital Course:  Ms. Arley Funez is a 49 yo F with pmh DM1, renal and pancreas transplant (), ESRD on HD who initially presented 2018 with lethargy and nausea. Found to have diabetic ketoacidosis with associated metabolic encephalopathy. Etiology thought to be poor compliance with insulin. Now presents to ARU with impaired mobility, self-care, and cognition below her baseline.     Inpatient Rehabilitation Course:   Matt Linares is a 50 y.o. female admitted to inpatient rehabilitation on 10/1/2018 with debility and metabolic encephalopathy due to DKA. The patient participated in an aggressive multidisciplinary inpatient rehabilitation program involving 3 hours of therapy per day, at least 5 days per week. She made good progress with regard to her endurance, balance, strength, and cognition. Overall functioning at modified independent level now. Discharging home with  and children. Impairments: decreased endurance, weakness, decreased balance, cognition    Medical Management:  Debility - PT/OT to address endurance, strength, compensatory strategies, equipment     Metabolic Encephalopathy - Due to DKA. Now resolved. Regulated sleep/wake (with melatonin and trazodone). Emphasis on routine. SLP signed off.      Uncontrolled DM1 - DKA resolved. Decreased lantus, dc'ed prandial, ISS. Provided education and counseling to improve compliance.      HTN - Labile/difficult to control. Continue nifedipine, metoprolol, doxazosin, clonidine.     ESRD on HD MWF - nephrology consulted     Chronic troponin elevation - likely related to HTN and ESRD.  No chest pain, EKG not suggestive of ischemia.  No further workup.     Hypothyroidism - levothyroxine.     PUD/GERD - Pantoprazole       Discharge Exam:  Const: Alert. No distress, pleasant. HEENT: Normocephalic, atraumatic. Normal sclera/conjunctiva. MMM. CV: Regular rate and rhythm. Resp: No respiratory distress. Lungs CTAB. Abd: NABS+. Soft, nontender, nondistended   Ext: No edema. Neuro: Alert, oriented, appropriately interactive.    Psych: Cooperative, appropriate mood and affect      Discharge Functional Status:    Physical therapy:  Bed Mobility: Scooting: Supervision (to EOB)  Transfers: Sit to Stand: Modified independent (up to RW)  Stand to sit: Modified independent  Bed to Chair: Modified independent (using RW)  Stand Pivot Transfers: Modified independent (using RW), Ambulation

## 2019-01-04 ENCOUNTER — HOSPITAL ENCOUNTER (EMERGENCY)
Age: 49
Discharge: HOME | End: 2019-01-04
Attending: EMERGENCY MEDICINE
Payer: MEDICARE

## 2019-01-04 ENCOUNTER — APPOINTMENT (OUTPATIENT)
Dept: GENERAL RADIOLOGY | Age: 49
End: 2019-01-04
Payer: MEDICARE

## 2019-01-04 VITALS
BODY MASS INDEX: 20.7 KG/M2 | HEIGHT: 64 IN | SYSTOLIC BLOOD PRESSURE: 189 MMHG | RESPIRATION RATE: 16 BRPM | WEIGHT: 121.25 LBS | HEART RATE: 59 BPM | OXYGEN SATURATION: 100 % | TEMPERATURE: 97.6 F | DIASTOLIC BLOOD PRESSURE: 77 MMHG

## 2019-01-04 DIAGNOSIS — R11.2 NON-INTRACTABLE VOMITING WITH NAUSEA, UNSPECIFIED VOMITING TYPE: ICD-10-CM

## 2019-01-04 DIAGNOSIS — N18.4 STAGE 4 CHRONIC KIDNEY DISEASE (HCC): Primary | ICD-10-CM

## 2019-01-04 DIAGNOSIS — E87.6 HYPOKALEMIA: ICD-10-CM

## 2019-01-04 DIAGNOSIS — E87.1 HYPONATREMIA: ICD-10-CM

## 2019-01-04 LAB
A/G RATIO: 1.3 (ref 1.1–2.2)
ALBUMIN SERPL-MCNC: 4.2 G/DL (ref 3.4–5)
ALP BLD-CCNC: 170 U/L (ref 40–129)
ALT SERPL-CCNC: 12 U/L (ref 10–40)
ANION GAP SERPL CALCULATED.3IONS-SCNC: 17 MMOL/L (ref 3–16)
AST SERPL-CCNC: 24 U/L (ref 15–37)
BILIRUB SERPL-MCNC: 0.5 MG/DL (ref 0–1)
BUN BLDV-MCNC: 12 MG/DL (ref 7–20)
CALCIUM SERPL-MCNC: 9.7 MG/DL (ref 8.3–10.6)
CHLORIDE BLD-SCNC: 88 MMOL/L (ref 99–110)
CO2: 23 MMOL/L (ref 21–32)
CREAT SERPL-MCNC: 3.6 MG/DL (ref 0.6–1.1)
EKG ATRIAL RATE: 59 BPM
EKG DIAGNOSIS: NORMAL
EKG P AXIS: 42 DEGREES
EKG P-R INTERVAL: 176 MS
EKG Q-T INTERVAL: 540 MS
EKG QRS DURATION: 102 MS
EKG QTC CALCULATION (BAZETT): 534 MS
EKG R AXIS: 124 DEGREES
EKG T AXIS: 30 DEGREES
EKG VENTRICULAR RATE: 59 BPM
GFR AFRICAN AMERICAN: 16
GFR NON-AFRICAN AMERICAN: 13
GLOBULIN: 3.2 G/DL
GLUCOSE BLD-MCNC: 222 MG/DL (ref 70–99)
HCT VFR BLD CALC: 36.1 % (ref 36–48)
HEMOGLOBIN: 12.1 G/DL (ref 12–16)
INR BLD: 0.95 (ref 0.86–1.14)
LACTIC ACID: 1.3 MMOL/L (ref 0.4–2)
LIPASE: 36 U/L (ref 13–60)
MAGNESIUM: 1.9 MG/DL (ref 1.8–2.4)
MCH RBC QN AUTO: 31.3 PG (ref 26–34)
MCHC RBC AUTO-ENTMCNC: 33.4 G/DL (ref 31–36)
MCV RBC AUTO: 93.6 FL (ref 80–100)
PDW BLD-RTO: 14.4 % (ref 12.4–15.4)
PHOSPHORUS: 2.3 MG/DL (ref 2.5–4.9)
PLATELET # BLD: 78 K/UL (ref 135–450)
PLATELET SLIDE REVIEW: ABNORMAL
PMV BLD AUTO: 9.8 FL (ref 5–10.5)
POTASSIUM SERPL-SCNC: 3.1 MMOL/L (ref 3.5–5.1)
POTASSIUM SERPL-SCNC: 3.8 MMOL/L (ref 3.5–5.1)
PROTHROMBIN TIME: 10.8 SEC (ref 9.8–13)
RBC # BLD: 3.86 M/UL (ref 4–5.2)
SODIUM BLD-SCNC: 128 MMOL/L (ref 136–145)
SPECIMEN STATUS: NORMAL
TOTAL PROTEIN: 7.4 G/DL (ref 6.4–8.2)
WBC # BLD: 4.4 K/UL (ref 4–11)

## 2019-01-04 PROCEDURE — 83690 ASSAY OF LIPASE: CPT

## 2019-01-04 PROCEDURE — 83735 ASSAY OF MAGNESIUM: CPT

## 2019-01-04 PROCEDURE — 96361 HYDRATE IV INFUSION ADD-ON: CPT

## 2019-01-04 PROCEDURE — 71045 X-RAY EXAM CHEST 1 VIEW: CPT

## 2019-01-04 PROCEDURE — 93005 ELECTROCARDIOGRAM TRACING: CPT | Performed by: NURSE PRACTITIONER

## 2019-01-04 PROCEDURE — 99284 EMERGENCY DEPT VISIT MOD MDM: CPT

## 2019-01-04 PROCEDURE — 93010 ELECTROCARDIOGRAM REPORT: CPT | Performed by: INTERNAL MEDICINE

## 2019-01-04 PROCEDURE — 84132 ASSAY OF SERUM POTASSIUM: CPT

## 2019-01-04 PROCEDURE — 85610 PROTHROMBIN TIME: CPT

## 2019-01-04 PROCEDURE — 6370000000 HC RX 637 (ALT 250 FOR IP): Performed by: NURSE PRACTITIONER

## 2019-01-04 PROCEDURE — 96375 TX/PRO/DX INJ NEW DRUG ADDON: CPT

## 2019-01-04 PROCEDURE — 36415 COLL VENOUS BLD VENIPUNCTURE: CPT

## 2019-01-04 PROCEDURE — 83605 ASSAY OF LACTIC ACID: CPT

## 2019-01-04 PROCEDURE — 84100 ASSAY OF PHOSPHORUS: CPT

## 2019-01-04 PROCEDURE — 6360000002 HC RX W HCPCS: Performed by: NURSE PRACTITIONER

## 2019-01-04 PROCEDURE — 2580000003 HC RX 258: Performed by: NURSE PRACTITIONER

## 2019-01-04 PROCEDURE — 96374 THER/PROPH/DIAG INJ IV PUSH: CPT

## 2019-01-04 PROCEDURE — 85027 COMPLETE CBC AUTOMATED: CPT

## 2019-01-04 PROCEDURE — 80053 COMPREHEN METABOLIC PANEL: CPT

## 2019-01-04 RX ORDER — PROMETHAZINE HYDROCHLORIDE 25 MG/ML
25 INJECTION, SOLUTION INTRAMUSCULAR; INTRAVENOUS ONCE
Status: COMPLETED | OUTPATIENT
Start: 2019-01-04 | End: 2019-01-04

## 2019-01-04 RX ORDER — 0.9 % SODIUM CHLORIDE 0.9 %
1000 INTRAVENOUS SOLUTION INTRAVENOUS ONCE
Status: COMPLETED | OUTPATIENT
Start: 2019-01-04 | End: 2019-01-04

## 2019-01-04 RX ORDER — PROMETHAZINE HYDROCHLORIDE 25 MG/1
25 TABLET ORAL EVERY 6 HOURS PRN
Qty: 10 TABLET | Refills: 0 | Status: SHIPPED | OUTPATIENT
Start: 2019-01-04 | End: 2019-01-11

## 2019-01-04 RX ORDER — ONDANSETRON 2 MG/ML
4 INJECTION INTRAMUSCULAR; INTRAVENOUS ONCE
Status: COMPLETED | OUTPATIENT
Start: 2019-01-04 | End: 2019-01-04

## 2019-01-04 RX ORDER — POTASSIUM CHLORIDE 20 MEQ/1
40 TABLET, EXTENDED RELEASE ORAL ONCE
Status: COMPLETED | OUTPATIENT
Start: 2019-01-04 | End: 2019-01-04

## 2019-01-04 RX ADMIN — ONDANSETRON 4 MG: 2 INJECTION INTRAMUSCULAR; INTRAVENOUS at 01:26

## 2019-01-04 RX ADMIN — PROMETHAZINE HYDROCHLORIDE 25 MG: 25 INJECTION INTRAMUSCULAR; INTRAVENOUS at 02:19

## 2019-01-04 RX ADMIN — POTASSIUM CHLORIDE 40 MEQ: 20 TABLET, EXTENDED RELEASE ORAL at 03:06

## 2019-01-04 RX ADMIN — SODIUM CHLORIDE 1000 ML: 9 INJECTION, SOLUTION INTRAVENOUS at 01:28

## 2019-01-04 ASSESSMENT — ENCOUNTER SYMPTOMS
SHORTNESS OF BREATH: 0
NAUSEA: 1
BACK PAIN: 0
BLOOD IN STOOL: 0
ALLERGIC/IMMUNOLOGIC NEGATIVE: 1
DIARRHEA: 0
COLOR CHANGE: 1
COUGH: 0
ABDOMINAL DISTENTION: 0
ABDOMINAL PAIN: 0
VOMITING: 1
WHEEZING: 0
CONSTIPATION: 0

## 2020-11-05 NOTE — PLAN OF CARE
Problem: Nutrition  Goal: Optimal nutrition therapy  Outcome: Met This Shift  Patient has eaten well today for all her meals, no nausea, reported.  Candelario Paez [Good] : ~his/her~ current health as good [No falls in past year] : Patient reported no falls in the past year [0] : 2) Feeling down, depressed, or hopeless: Not at all (0) [Patient reported colonoscopy was normal] : Patient reported colonoscopy was normal [HIV test declined] : HIV test declined [Hepatitis C test declined] : Hepatitis C test declined [None] : None [With Family] : lives with family [# of Members in Household ___] :  household currently consist of [unfilled] member(s) [Employed] : employed [High School] : high school [] :  [# Of Children ___] : has [unfilled] children [Sexually Active] : sexually active [Feels Safe at Home] : Feels safe at home [Fully functional (bathing, dressing, toileting, transferring, walking, feeding)] : Fully functional (bathing, dressing, toileting, transferring, walking, feeding) [Fully functional (using the telephone, shopping, preparing meals, housekeeping, doing laundry, using] : Fully functional and needs no help or supervision to perform IADLs (using the telephone, shopping, preparing meals, housekeeping, doing laundry, using transportation, managing medications and managing finances) [Reports changes in hearing] : Reports changes in hearing [Smoke Detector] : smoke detector [Carbon Monoxide Detector] : carbon monoxide detector [Safety elements used in home] : safety elements used in home [Seat Belt] :  uses seat belt [Sunscreen] : uses sunscreen [Discussed at today's visit] : Advance Directives Discussed at today's visit [Designated Healthcare Proxy] : Designated healthcare proxy [Name: ___] : Health Care Proxy's Name: [unfilled]  [Relationship: ___] : Relationship: [unfilled] [Aggressive treatment] : aggressive treatment [Fair] :  ~his/her~ mood as fair [FreeTextEntry1] : physical [Intercurrent Urgi Care visits] : went to urgent care [] : No [Yes] : Yes [2 - 3 times a week (3 pts)] : 2 - 3  times a week (3 points) [3 or 4 (1 pt)] : 3 or 4  (1 point) [Never (0 pts)] : Never (0 points) [No] : In the past 12 months have you used drugs other than those required for medical reasons? No [de-identified] : covid [de-identified] : urology, general surgeon cardio [de-identified] : occ 1 -2 glasses of wine [Audit-CScore] : 4 [de-identified] : walk [de-identified] : no fat sugar [[id number]] : 0 [Patient declined Low Dose CT Scan] : Patient declined Low Dose CT Scan [Patient declined Retinal Exam] : Patient declined Retinal Exam. [Change in mental status noted] : No change in mental status noted [Language] : denies difficulty with language [Behavior] : denies difficulty with behavior [Handling Complex Tasks] : denies difficulty handling complex tasks [Reasoning] : denies difficulty with reasoning [Spatial Ability and Orientation] : denies difficulty with spatial ability and orientation [High Risk Behavior] : no high risk behavior [Reports changes in vision] : Reports no changes in vision [Reports changes in dental health] : Reports no changes in dental health [Guns at Home] : no guns at home [Travel to Developing Areas] : does not  travel to developing areas [TB Exposure] : is not being exposed to tuberculosis [Caregiver Concerns] : does not have caregiver concerns [ColonoscopyDate] : 7/6/14 [FreeTextEntry2] : [employer] [de-identified] : tinnitus intermittent